# Patient Record
Sex: FEMALE | Race: WHITE | NOT HISPANIC OR LATINO | Employment: OTHER | URBAN - METROPOLITAN AREA
[De-identification: names, ages, dates, MRNs, and addresses within clinical notes are randomized per-mention and may not be internally consistent; named-entity substitution may affect disease eponyms.]

---

## 2017-01-23 ENCOUNTER — GENERIC CONVERSION - ENCOUNTER (OUTPATIENT)
Dept: OTHER | Facility: OTHER | Age: 82
End: 2017-01-23

## 2017-04-14 ENCOUNTER — GENERIC CONVERSION - ENCOUNTER (OUTPATIENT)
Dept: OTHER | Facility: OTHER | Age: 82
End: 2017-04-14

## 2017-04-27 ENCOUNTER — ALLSCRIPTS OFFICE VISIT (OUTPATIENT)
Dept: OTHER | Facility: OTHER | Age: 82
End: 2017-04-27

## 2017-06-08 ENCOUNTER — ALLSCRIPTS OFFICE VISIT (OUTPATIENT)
Dept: OTHER | Facility: OTHER | Age: 82
End: 2017-06-08

## 2017-12-08 ENCOUNTER — ALLSCRIPTS OFFICE VISIT (OUTPATIENT)
Dept: OTHER | Facility: OTHER | Age: 82
End: 2017-12-08

## 2017-12-20 NOTE — PROGRESS NOTES
Assessment  1  Parkinson's disease (332 0) (G20)    Discussion/Summary  Discussion Summary:   Patient continues to have severe rigidity, bradykinesia and prominent postural instability  She has had overall improvement with increased Sinemet doses in the past however she also had developed hallucinations which improved after stopping Amantadine  Discussed a trial of increasing her Sinemet dose once again but very slowly to try and avoid side effects  She will take 2 5tabs at 7:30am and 2tabs at 11:30am and 4:30pm for the next 2 weeks then 2 5tabs at 7:30am and 11:30am and 2tabs at 4:30pm for 2 weeks then 2 5tabs tid  If she has any side effects will reduce back to 2tabs tid  May need to consider a trial of Seroquel or Nuplazid to help with hallucinations in order to increase her levodopa in the future  She was also encouraged to remain active and ordered PT to work with her on balance, gait, mobility, ROM and general conditioning  Forwarding the case and plan to Dr Chen Mann for review  Counseling Documentation With Imm: The patient, patient's family was counseled regarding instructions for management,-- prognosis,-- patient and family education,-- impressions,-- risks and benefits of treatment options  total time of encounter was 35 minutes-- and-- 18 minutes was spent counseling  Chief Complaint  Chief Complaint Free Text Note Form: Patient present for a neurological follow up for Parkinson's disease with heaviness in her legs  History of Present Illness  HPI: Rosalee Page is an 80year old [place] [address] resident with Parkinsonâs disease, who presents for follow up  She previously followed with Dr Yunier Baez and then Dr Ifeoma Connelly  To review, parkinsonâs disease was diagnosed in 2000 after she developed a left hand rest tremor  She was started on Sinemet which helped  She had hallucinations 6-7 years ago   Ever since then she has been on Abilify which was prescribed at [facility]  She saw another neurologist but he wanted her to come off the Abilify due to its interaction with Sinemet, so they decided to seek care elsewhere  Her son states when she had the hallucination she refused to eat and became really sick  When she originally presented she was taking amantadine 50mg q 8am and 12pm, Sinemet 25/100 1 po tid (7:30, 11:30, 4:30pm) Â½ hour prior to meals, and also on Abilify 5mg daily  There is no family history of PD  At her initial visit she was noted to have severe rigidity, moderate bradykinesia, rest tremor and prominent postural instability with an inability to walk  Sinemet was increased despite being on Abilify  At her last visit she had noted overall improvement with the increased Sinemet dose  She did have some hallucniations however and her Amantadine was stopped with improvement  She is currently taking Sinemet 25/100mg 2tabs tid (7:30am, 11:30am, 4:30pm)  She remains on Abilify 5mg daily  She feels that she is overall doing well  She does feel that her legs are feeling heavier when walking  She walks to and from the bathroom  No falls  She does exercises daily at the facility  She requires assistance with dressing and showering  She is eating well and she is able to feed herself  As long as she remembers to take some bites then she is fine but if she takes bigger bites she can choke at times  She has been having some tightness in the legs when she is sitting in her chair trying to relax or in the evening when she is going to bed  It improves when she gets up and walks  This can happen every few nights  She feels that her memory is still good  She sometimes feels that she is âmore shakyâ about 2 hours after taking a dose of Sinemet  She does not have a clear on or off with the medication  Her tremor comes and goes and there are times these are very well controlled  Reviewed her ROS, FH, SH and social history        Review of Systems  Neurological ROS:  Constitutional: no fever, no chills, no recent weight gain, no recent weight loss, no complaints of feeling tired, no changes in appetite  HEENT:  no sinus problems, not feeling congested, no blurred vision, no dryness of the eyes, no eye pain, no hearing loss, no tinnitus, no mouth sores, no sore throat, no hoarseness, no dysphagia, no masses, no bleeding  Cardiovascular:  no chest pain or pressure, no palpitations present, the heart rate was not rapid or irregular, no swelling in the arms or legs, no poor circulation  Respiratory:  no unusual or persistant cough, no shortness of breath with or without exertion  Gastrointestinal:  no nausea, no vomiting, no diarrhea, no abdominal pain, no changes in bowel habits, no melena, no loss of bowel control  Genitourinary:  no incontinence, no feelings of urinary urgency, no increase in frequency, no urinary hesitancy, no dysuria, no hematuria  Musculoskeletal:  no arthralgias, no myalgias, no immobility or loss of function, no head/neck/back pain, no pain while walking  Integumentary  no masses, no rash, no skin lesions, no livedo reticularis  Psychiatric:  no anxiety, no depression, no mood swings, no psychiatric hospitalizations, no sleep problems  Endocrine  no unusual weight loss or gain, no excessive urination, no excessive thirst, no hair loss or gain, no hot or cold intolerance, no menstrual period change or irregularity, no loss of sexual ability or drive, no erection difficulty, no nipple discharge  Hematologic/Lymphatic:  no unusual bleeding, no tendency for easy bruising, no clotting skin or lumps  Neurological General:  no headache, no nausea or vomiting, no lightheadedness, no convulsions, no blackouts, no syncope, no trauma, no photopsia, no increased sleepiness, no trouble falling asleep, no snoring, no awakening at night    Neurological Mental Status:  no confusion, no mood swings, no alteration or loss of consciousness, no difficulty expressing/understanding speech, no memory problems  Neurological Cranial Nerves:  no blurry or double vision, no loss of vision, no face drooping, no facial numbness or weakness, no taste or smell loss/changes, no hearing loss or ringing, no vertigo or dizziness, no dysphagia, no slurred speech  Neurological Motor findings include: tremor  Neurological Coordination:  no unsteadiness, no vertigo or dizziness, no clumsiness, no problems reaching for objects  Neurological Sensory:  no numbness, no pain, no tingling, does not fall when eyes closed or taking a shower  Neurological Gait: difficulty walking  Active Problems  1  Ambulatory dysfunction (719 7) (R26 2)   2  Benign essential HTN (401 1) (I10)   3  Depression with anxiety (300 4) (F41 8)   4  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)   5  Edema (782 3) (R60 9)   6  Essential hypertriglyceridemia (272 1) (E78 1)   7  Hypercholesterolemia (272 0) (E78 00)   8  Osteoarthritis of knee (715 36) (M17 10)   9  Parkinson's disease (332 0) (G20)    Past Medical History  1  History of Constipation (564 00) (K59 00)   2  History of Dermatitis (692 9) (L30 9)   3  History of Encounter for screening mammogram for malignant neoplasm of breast (V76 12) (Z12 31)   4  History of dehydration (V12 29) (Z86 39)   5  History of dizziness (V13 89) (Z87 898)   6  History of Lightheadedness (780 4) (R42)    Surgical History  1  History of Denial Of Any Significant Medical History    Family History  Mother    1  No pertinent family history  Family History    2  Family history of No Significant Family History    Social History   · Denied: History of Alcohol Use (History)   · Never A Smoker   · Never Drank Alcohol   · Never Used Drugs    Current Meds   1  Abilify 5 MG Oral Tablet; TAKE 1 TABLET DAILY  Requested for: 80PNE6623; Last Rx:27Jan2015 Ordered   2  Alcohol Swabs Pad; USE AS DIRECTED; Therapy: 95YOB9520 to (Last Rx:19Jan2015)  Requested for: 02NHK3845 Ordered   3   BD Insulin Syringe Ultrafine 30G X 1/2 0 3 ML Miscellaneous; USE AS DIRECTED; Therapy: 48WXL6202 to (Last Rx:32Pvw6758)  Requested for: 35Glg3960; Status: ACTIVE - Renewal Denied Ordered   4  Carbidopa-Levodopa  MG Oral Tablet; 2 po tid; Therapy: 74GSH3591 to (Evaluate:17Mar2018)  Requested for: 56Ndc8337; Last Rx:28Slx9198 Ordered   5  Ecotrin 81 MG TBEC; TAKE 1 TABLET DAILY; Therapy: (Recorded:19Avr5961) to Recorded   6  Furosemide 40 MG Oral Tablet; TAKE 1 TABLET DAILY  Requested for: 77PWD4279; Last Rx:27Jan2015 Ordered   7  Januvia 50 MG Oral Tablet; TAKE 1 TABLET DAILY  Requested for: 76QXX7752; Last Rx:27Jan2015 Ordered   8  Ketoconazole 2 % External Cream; APPLY SPARINGLY TO AFFECTED AREA(S) PRN  Requested for: 43Uqw8766; Last Rx:08Ure9345; Status: ACTIVE - Renewal Denied Ordered   9  Metoprolol Succinate  MG Oral Tablet Extended Release 24 Hour; TAKE 1 TABLET Bedtime  Requested for: 40LDP1945; Last Rx:09Jan2015 Ordered   10  Neurontin 300 MG Oral Capsule; TAKE 1 CAPSULE AT BEDTIME; Therapy: (Recorded:04Xix1097) to Recorded   11  Norvasc 5 MG Oral Tablet; TAKE 1 TABLET DAILY AS DIRECTED; Therapy: (Recorded:08Jun2017) to Recorded   12  NovoLOG 100 UNIT/ML Subcutaneous Solution; INJECT SUBCUTANEOUSLY AS  DIRECTED; Therapy: 35ZNM6203 to (Last Rx:06Mar2015)  Requested for: 49TAM8533 Ordered   13  Oxycodone-Acetaminophen 5-325 MG Oral Tablet; TAKE ONE TAB PRN every 6 hours as  needed; Last Rx:36Zme9331 Ordered   14  PreviDent 5000 Booster 1 1 % Dental Paste; USE AS DIRECTED  May be kept at  bedside; Last OF:21VQT9102 Ordered   15  PreviDent 5000 Booster 1 1 % Dental Paste; USE AS DIRECTED; Therapy: (334 6172) to Recorded   16  Prodigy Lancets 28G MISC; TESTS BID  USE AS DIRECTED; Therapy: (873 6120) to Recorded   17  SB Polyethylene Glycol 3350 Oral Powder; MIX 1 CAPFUL (17GM) IN 8 OUNCES OF  LIQUID AND DRINK DAILY  Requested for: 47OYC5839; Last Rx:59Gfw8914 Ordered   18   Simvastatin 20 MG Oral Tablet; TAKE 1 TABLET DAILY  Requested for: 08TCP0874; Last  Rx:27Jan2015 Ordered   19  TRUEtest Test In Vitro Strip; TEST TWICE DAILY  Requested for: 44RDO6543; Last  HJ:50IJV1365 Ordered   20  Tylenol 325 MG Oral Tablet; TAKE 2 TABLET Every 4 hours PRN  Requested for:  39ALT8716; Last Rx:63Uhn5136 Ordered   21  Vitamin B-12 500 MCG Oral Tablet; TAKE 1 TABLET DAILY  Requested for: 91LLU3760; Last Rx:27Jan2015 Ordered   22  Vitamin D3 1000 UNIT Oral Tablet; take 2 tablet daily  Requested for: 86UCZ8312; Last  Rx:27Jan2015 Ordered    Allergies  1  MetFORMIN HCl TABS   2  Penicillins   3  Sulfa Drugs   4  Torsemide TABS    Vitals  Signs   Recorded: 53WAS8517 23:39VC   Systolic: 620, LUE, Standing  Diastolic: 60, LUE, Standing  Recorded: 79FPC6279 01:29PM   Heart Rate: 64, L Brachial Artery  Pulse Quality: Normal, L Brachial Artery  Respiration Quality: Normal  Respiration: 12  Systolic: 372, LUE, Sitting  Diastolic: 62, LUE, Sitting  Height Unobtainable: Yes  Weight Unobtainable: Yes    Physical Exam   Constitutional  General appearance: Abnormal  -- (Sitting in wheel chair  Moderate Hypomimia 2  Moderate hypophonia 2  )  Eyes  Ophthalmoscopic examination: Vision is grossly normal  Gross visual field testing by confrontation shows no abnormalities  EOMI in both eyes  Conjunctivae clear  Eyelids normal palpebral fissures equal  Orbits exhibit normal position  No discharge from the eyes  PERRL  Bilateral optic discs: not visualized  Musculoskeletal  Gait and station: Abnormal  -- (Only arises with assistance 3  Posture moderately stooped 2  Spontaneous retropulsion 3  Severe bradykinesia 3  Ambulated with walker  Very small steps with shuffling gait  No freezing  En bloc turns  )  Muscle strength: Normal strength throughout     Muscle tone: Abnormal  -- (Rigidity present in neck 4, RUE 2, LUE 3, RLE 2, LLE3)  Involuntary movements: Abnormal involuntary movements were observed  -- (Mild to moderate amplitude rest tremor RUE 0, LUE 2, RLE 0, LLE 0  No action tremors of RUE, LUE  Moderate bradykinesia on FT 2,3, HG 2,3, CARLINE 2,3 , HT 2,2 and toe taps 2,3  No dyskinesia  Striatal posturing of the left hand)  Neurologic  Orientation to person, place, and time: Normal    Recent and remote memory: Demonstrates normal memory  Attention span and concentration: Normal thought process and attention span  Language: Names objects, able to repeat phrases and speaks spontaneously  Fund of knowledge: Normal vocabulary with appropriate knowledge of current events and past history  2nd cranial nerve: Normal    3rd, 4th, and 6th cranial nerves: Normal    5th cranial nerve: Normal    7th cranial nerve: Normal    8th cranial nerve: Normal    9th cranial nerve: Normal    11th cranial nerve: Normal    12th cranial nerve: Normal    Sensation: Normal   Sensory exam: intact to light touch  Reflexes: Abnormal  -- (positive glabellar )  Coordination: Abnormal    Mood and affect: Normal        Attending Note  Collaborating Physician Note: Collaborating Note: I agree with the Advanced Practitioner note   I discussed the case with the Advanced Practitioner and reviewed the AP note      Signatures   Electronically signed by : Nilesh Potts, [facility]; Dec 19 2017 12:39PM EST                       (Author)    Electronically signed by : Reji Fulton MD; Dec 19 2017 12:57PM EST                       (Author)

## 2018-01-12 VITALS
HEIGHT: 60 IN | SYSTOLIC BLOOD PRESSURE: 120 MMHG | DIASTOLIC BLOOD PRESSURE: 72 MMHG | BODY MASS INDEX: 35.34 KG/M2 | HEART RATE: 69 BPM | RESPIRATION RATE: 12 BRPM | OXYGEN SATURATION: 94 % | WEIGHT: 180 LBS

## 2018-01-13 VITALS
HEIGHT: 60 IN | HEART RATE: 68 BPM | DIASTOLIC BLOOD PRESSURE: 77 MMHG | OXYGEN SATURATION: 98 % | SYSTOLIC BLOOD PRESSURE: 180 MMHG

## 2018-01-23 VITALS — RESPIRATION RATE: 12 BRPM | HEART RATE: 64 BPM | SYSTOLIC BLOOD PRESSURE: 142 MMHG | DIASTOLIC BLOOD PRESSURE: 60 MMHG

## 2018-04-24 ENCOUNTER — OFFICE VISIT (OUTPATIENT)
Dept: NEUROLOGY | Facility: CLINIC | Age: 83
End: 2018-04-24
Payer: MEDICARE

## 2018-04-24 VITALS
DIASTOLIC BLOOD PRESSURE: 70 MMHG | BODY MASS INDEX: 33.61 KG/M2 | HEIGHT: 61 IN | SYSTOLIC BLOOD PRESSURE: 172 MMHG | HEART RATE: 80 BPM | WEIGHT: 178 LBS

## 2018-04-24 DIAGNOSIS — I10 ESSENTIAL HYPERTENSION: ICD-10-CM

## 2018-04-24 DIAGNOSIS — G20 PARKINSON'S DISEASE (HCC): Primary | ICD-10-CM

## 2018-04-24 PROCEDURE — 99214 OFFICE O/P EST MOD 30 MIN: CPT | Performed by: PHYSICIAN ASSISTANT

## 2018-04-24 RX ORDER — AMLODIPINE BESYLATE 5 MG/1
5 TABLET ORAL DAILY
COMMUNITY
Start: 2014-08-20 | End: 2019-01-01 | Stop reason: HOSPADM

## 2018-04-24 RX ORDER — BIOTIN 1 MG
2 TABLET ORAL DAILY
COMMUNITY

## 2018-04-24 RX ORDER — FUROSEMIDE 40 MG/1
20 TABLET ORAL DAILY
COMMUNITY
Start: 2011-03-03

## 2018-04-24 RX ORDER — METOPROLOL SUCCINATE 100 MG/1
1 TABLET, EXTENDED RELEASE ORAL
COMMUNITY

## 2018-04-24 RX ORDER — GABAPENTIN 300 MG/1
1 CAPSULE ORAL
COMMUNITY
End: 2018-12-05 | Stop reason: ALTCHOICE

## 2018-04-24 RX ORDER — AMANTADINE HYDROCHLORIDE 50 MG/5ML
5 SOLUTION ORAL 2 TIMES DAILY
COMMUNITY
Start: 2015-05-26 | End: 2018-04-24 | Stop reason: ALTCHOICE

## 2018-04-24 RX ORDER — ARIPIPRAZOLE 5 MG/1
10 TABLET ORAL DAILY
COMMUNITY

## 2018-04-24 RX ORDER — CHOLECALCIFEROL (VITAMIN D3) 125 MCG
1 CAPSULE ORAL DAILY
COMMUNITY

## 2018-04-24 RX ORDER — SIMVASTATIN 20 MG
20 TABLET ORAL DAILY
COMMUNITY
Start: 2011-03-03

## 2018-04-24 RX ORDER — ACETAMINOPHEN 325 MG/1
325 TABLET ORAL EVERY 6 HOURS
COMMUNITY
Start: 2013-04-16 | End: 2018-12-05 | Stop reason: ALTCHOICE

## 2018-04-24 RX ORDER — KETOCONAZOLE 20 MG/G
CREAM TOPICAL
COMMUNITY
End: 2018-12-05 | Stop reason: ALTCHOICE

## 2018-04-24 NOTE — ASSESSMENT & PLAN NOTE
Patient hypertensive today  After looking back through her previous pressures it appears this is an issue at times  Would recommend close watch on this and treat if needed

## 2018-04-24 NOTE — PROGRESS NOTES
Patient ID: Fátima Lin is a 80 y o  female  Assessment/Plan:    Parkinson's disease ([facility] [address] )  Patient continues to have rigidity, tremor, bradykinesia and prominent postural instability  She has been tolerating the increased Sinemet well without any hallucinations  It appears she has improvement of her tremors after taking a dose of Sinemet and they worsen around the time she is due  Will try and make a further increase at this time to Sinemet 2tabs qid  She will take 2tabs at 7:30am, 10:30am, 1:30pm, 4:30pm   She also remains on Abilify 5mg daily which was initially started to control her hallucinations  In the future if hallucinations become an issue then would consider switching from Abilify to Nuplazid or Seroquel  She would benefit from more therapy and ROM exercises to maintain her mobility and function  Essential hypertension  Patient hypertensive today  After looking back through her previous pressures it appears this is an issue at times  Would recommend close watch on this and treat if needed  Subjective:    Fátima Lin is an 80year old [place] [address] resident with Parkinson's disease, who presents for follow up  She previously followed with Dr Sil Sanchez and then Dr Dianelys Arango  To review, parkinson's disease was diagnosed in 2000 after she developed a left hand rest tremor  She was started on Sinemet which helped  She had hallucinations 6-7 years ago  Ever since then she has been on Abilify which was prescribed at [facility]  She saw another neurologist but he wanted her to come off the Abilify due to its interaction with Sinemet, so they decided to seek care elsewhere  Her son states when she had the hallucination she refused to eat and became really sick  When she originally presented she was taking amantadine 50mg q 8am and 12pm, Sinemet 25/100 1 po tid (7:30, 11:30, 4:30pm) ½ hour prior to meals, and also on Abilify 5mg daily   There is no family history of PD  At her initial visit she was noted to have severe rigidity, moderate bradykinesia, rest tremor and prominent postural instability with an inability to walk  Sinemet was increased despite being on Abilify with some overall improvement  Hallucinations with this increase however that improved off Amantadine  At her last visit she continued to have rigidity, bradykinesia and prominent postural instability  Discussed a trial of increasing her Sinemet further and she is currently on 2 5tabs tid (7:30am, 11:30am, 4:30pm)  She remains on Abilify 5mg daily  She feels that her legs are getting heavier  She also has some occasional tremor in the left hand  She feels the right side tremor is pretty well controlled  She resides at [facility] in assisted living  She has assistance with dressing and showering  She is able to transfer from the chair to the bathroom with the walker  No falls  She is able to feed herself  She does have purred food which she tolerates well  She sleeps well through the night  No complaints of any hallucinations since the last visit even with the increased Sinemet dose  The nurses give her medications  No drooling during the day  She does notice the tremor more around the time she is due for a dose of Sinemet and then it improves a bit after taking it  No clear improvement of the heaviness in her legs  Total time spent today 35 minutes  Greater than 50% of total time was spent with the patient and / or family counseling and / or coordination of care           Objective:    Blood pressure (!) 172/70, pulse 80, height 5' 1" (1 549 m), weight 80 7 kg (178 lb)  Physical Exam   Constitutional: She appears well-developed and well-nourished  Eyes: EOM are normal  Pupils are equal, round, and reactive to light  Neurological Exam    Mental Status  The patient is alert  She has normal attention span and concentration   She has a normal fund of knowledge  Cranial Nerves    CN II: The patient's visual acuity and visual fields are normal   CN III, IV, VI: The patient's pupils are equally round and reactive to light and ocular movements are normal   CN V: The patient has normal facial sensation  CN VII:  The patient has symmetric facial movement  CN VIII:  The patient's hearing is normal   CN IX, X: The patient has symmetric palate movement and normal gag reflex  CN XI: The patient's shoulder shrug strength is normal   CN XII: The patient's tongue is midline without atrophy or fasciculations  Motor    Sitting in wheel chair  Moderate Hypomimia 2  Moderate hypophonia 2  Right head tilt  Rigidity present in neck 4, RUE 2, LUE 2, RLE 2, LLE 2  Mild to moderate amplitude rest tremor RUE 0, LUE 2, RLE 0, LLE 0  No action tremors of RUE, LUE  Moderate bradykinesia on FT 2,3, HG 2,3, CARLINE 2,3 , HT 2,2 and toe taps 2,3  No dyskinesia  Striatal posturing of the left hand  Sensory  The patient's sensation is to light touch  Reflexes  She has glabellar tap and palmomental (Bilaterally  ) release signs present  Gait and Coordination    Arises from the wheelchiar with assistance 3  Posture moderately stooped 2 with walker  Spontaneous retropulsion 3  Severe bradykinesia 3 with movements  Very small steps with shuffling gait  Slight freezing steps through the doorway  En bloc turns  ROS:    Review of Systems   Constitutional: Negative for appetite change and fever  HENT: Negative  Negative for hearing loss, tinnitus, trouble swallowing and voice change  Eyes: Negative  Negative for photophobia and pain  Respiratory: Negative  Negative for shortness of breath  Cardiovascular: Negative  Negative for palpitations  Gastrointestinal: Negative  Negative for nausea and vomiting  Endocrine: Negative  Negative for cold intolerance and heat intolerance  Genitourinary: Negative  Negative for dysuria, frequency and urgency  Musculoskeletal: Positive for gait problem  Negative for myalgias and neck pain  Skin: Negative  Negative for rash  Neurological: Positive for dizziness, tremors and weakness  Negative for seizures, syncope, facial asymmetry, speech difficulty, light-headedness, numbness and headaches  Hematological: Negative  Does not bruise/bleed easily  Psychiatric/Behavioral: Negative  Negative for confusion, hallucinations and sleep disturbance

## 2018-04-24 NOTE — PATIENT INSTRUCTIONS
Patient continues to have rigidity, tremor, bradykinesia and prominent postural instability  She has been tolerating the increased Sinemet well without any hallucinations  It appears she has improvement of her tremors after taking a dose of Sinemet and they worsen around the time she is due  Will try and make a further increase at this time to Sinemet 2tabs qid  She will take 2tabs at 7:30am, 10:30am, 1:30pm, 4:30pm   She also remains on Abilify 5mg daily which was initially started to control her hallucinations  In the future if hallucinations become an issue then would consider switching from Abilify to Nuplazid or Seroquel  She would benefit from more therapy and ROM exercises to maintain her mobility and function  Patient hypertensive on exam today  Would recommend continue follow up with this and treat if needed

## 2018-12-05 ENCOUNTER — OFFICE VISIT (OUTPATIENT)
Dept: NEUROLOGY | Facility: CLINIC | Age: 83
End: 2018-12-05
Payer: MEDICARE

## 2018-12-05 VITALS — BODY MASS INDEX: 33.63 KG/M2 | SYSTOLIC BLOOD PRESSURE: 124 MMHG | HEIGHT: 61 IN | DIASTOLIC BLOOD PRESSURE: 80 MMHG

## 2018-12-05 DIAGNOSIS — R44.3 HALLUCINATIONS: ICD-10-CM

## 2018-12-05 DIAGNOSIS — G20 PARKINSON'S DISEASE (HCC): Primary | ICD-10-CM

## 2018-12-05 PROCEDURE — 99214 OFFICE O/P EST MOD 30 MIN: CPT | Performed by: PSYCHIATRY & NEUROLOGY

## 2018-12-05 RX ORDER — ACETAMINOPHEN 325 MG/1
325 TABLET ORAL EVERY 4 HOURS PRN
COMMUNITY

## 2018-12-05 RX ORDER — POLYETHYLENE GLYCOL 3350 17 G/17G
17 POWDER, FOR SOLUTION ORAL DAILY
COMMUNITY

## 2018-12-05 NOTE — PROGRESS NOTES
Assessment/Plan:    Parkinson's disease [facility])  The patient is an 49-year-old woman with Parkinson's disease, symptom onset in 2000 with left hand tremor complicated by hallucinations on Abilify, postural instability gait disorder  The family remains very hesitant to take the patient off of Abilify given the severity of her hallucinations back in 2010  Her symptoms are overall stable prior  She has not noticed an obvious improvement with increased frequency of Sinemet dosing  We reviewed the patient's parkinsonism medical history today  We agreed not to make any changes in her Sinemet at this time  The patient would likely find clear improvement in her motor symptoms if she was taken off of the Abilify  The patient was not interested in 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  Diagnoses and all orders for this visit:    Parkinson's disease ([facility] [address] )    Hallucinations    Other orders  -     insulin aspart (NOVOLOG) 100 units/mL injection; Novolog U-100 Insulin aspart 100 unit/mL subcutaneous solution  -     polyethylene glycol (MIRALAX) 17 g packet; Take 17 g by mouth daily  -     nystatin (MYCOSTATIN) 100,000 units/mL suspension; Apply 500,000 Units to the mouth or throat 4 (four) times a day  -     acetaminophen (TYLENOL) 325 mg tablet; Take 325 mg by mouth every 4 (four) hours as needed for mild pain  -     Insulin Syringes, Disposable, (MONOJECT INS SYR 1CC) U-100 1 ML MISC; 10 Units by Does not apply route 2 (two) times a day 7 am and 4 pm  -     Sennosides (SENNA-TABS PO); Take 100 mg by mouth 2 (two) times a day 9 am and 9 pm  -     Dimethicone (REMEDY SKIN REPAIR) 1 5 % CREA;  Apply topically daily as needed  -     Alcohol Swabs ([employer]S ALCOHOL PREP SWABS) 70 % PADS; by Does not apply route daily as needed        Subjective:     Patient ID: Petrona Locke is a 80 y o  female  I had the pleasure of seeing your patient, Petrona Locke in the [address] at the [facility] for Neuroscience  Petrona Locke is an 80year old [facility] resident who presents in follow up for Parkinson's disease, symptom onset in 2000 with left hand tremor now complicated by hallucinations on abilify and prominent postural instability  She was initially followed by Dr Sachin Bateman after her diagnosis in 2000 and then Dr Jakob Rosado followed by Dr Nusrat Pickard  She appears to be sinemet responsive  Her sinemet has been slowly increased over the years  She developed hallucinations in 2010 and was started on Abilify at [facility] which did help  It appears her family has not wanted to take her off of the Abilify because she was doing so poorly when she had the hallucinations in the past (refusing to eat ect)  In 2008 she moved to a long term care facility  When she was last here her she was noted to have some wearing off which consisted of increased tremor so her sinemet was increased from 2 5 tabs TID to 2 tabs, 4 times daily  Today, she reports her walking is getting worse  Mostly wheelchair bound for > 1 year  "sometimes I have tremors and sometimes I don't"  She feels that her voice has gotten softer  Sometimes nothing comes out when she tries to speak - she is not interested in speech therapy  Otherwise she denies any major change  Feels some leg discomfort at night when trying to get to bed  She has never been convinced that the sinemet helps much with her symptoms       Current medications and timing:   Sinemet 25/100; 2tabs, 4 times per day @ 7:30am, 10:30am, 1:30pm, 4:30pm     Prior medication trials:   Amantadine - was concerned that it might cause hallucinations   Requip - Drowsiness     Sinemet - Nausea and vomiting initially     Regarding motor symptoms:   Tremor: still worse on the left, rarely on the right  Slowness: very slow Stiffness: left hand hand mostly, trouble opening her left hand  Trouble with swallowing: puree thick  Small bites  Appetite: no issues   Sleep: disrupted by dreams sometimes   Mood: mood is ok   Gait: no longer ambulatory   Help with taking medication: yes  Help preparing meals: yes  Help with eating: no   Help with bathing and showering: yes  Help with dressing: yes  Help with toileting: yes   Help with house hold tasks: yes    Regarding non-motor symptoms:   Lightheadedness: no   Memory trouble: good  Hallucinations: not recently   Physical activity: some PT in the chair  Bin     Regarding medication complications:  Wearing off: no   Dyskinesias: no (maybe when she was on the amantadine)   Dose failures:  maybe  Inconsistent results  The following portions of the patient's history were reviewed and updated as appropriate: allergies, current medications, past family history, past medical history, past social history, past surgical history and problem list       Objective:      /80 (BP Location: Left arm, Patient Position: Sitting, Cuff Size: Standard)   Ht 5' 1" (1 549 m)   BMI 33 63 kg/m²       Physical Exam    Neurological Exam   She presents in a wheelchair  She is awake and alert in no acute distress  She is cooperative with the exam  Increased response latency when answering questions  Relates her history without much difficulty  No paraphasic errors  Follows midline and appendicular commands without issue                                 Time since last dose:  1 hours     Speech  2    Facial Expression  3    Rigidity - Neck  2    Rigidity - Upper Extremity (Right)  2    Rigidity - Upper Extremity (Left)   2 L>R    Rigidity - Lower Extremity (Right)  3    Rigidity - Lower Extremity (Left)   4    Finger Taps (Right)   3    Finger Taps (Left)   4    Hand Movement (Right)  3    Hand Movement (Left)   4    Pronation/Supination (Right)  3    Pronation/Supination (Left)   4    Toe Tapping (Right) 4    Toe Tapping (Left) 3    Leg Agility (Right)  3    Leg Agility (Left)   4    Arising from Chair   Unable to stand and walk    Gait       Freezing of Gait     Postural Stability       Posture     Global spontaneity of movement 2    Postural Tremor (Right) 1    Postural Tremor (Left) 0    Kinetic Tremor (Right)  0    Kinetic Tremor (Left)  1    Rest tremor amplitude RUE 0    Rest tremor amplitude LUE 3    Rest tremor amplitude RLE 0    Reset tremor amplitude LLE 2    Lip/Jaw Tremor  3    Consistency of tremor 3    Motor Exam Total:        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  Review of Systems   Constitutional: Negative  Negative for appetite change and fever  HENT: Negative  Negative for hearing loss, tinnitus, trouble swallowing and voice change  Eyes: Negative  Negative for photophobia and pain  Respiratory: Negative  Negative for shortness of breath  Cardiovascular: Negative  Negative for palpitations  Gastrointestinal: Negative  Negative for nausea and vomiting  Endocrine: Negative  Negative for cold intolerance and heat intolerance  Genitourinary: Negative  Negative for dysuria, frequency and urgency  Musculoskeletal: Positive for gait problem  Negative for myalgias and neck pain  Skin: Negative  Negative for rash  Neurological: Positive for tremors (in hands) and speech difficulty (voice changing)  Negative for dizziness, seizures, syncope, facial asymmetry, weakness, light-headedness, numbness and headaches  Hematological: Negative  Does not bruise/bleed easily  Psychiatric/Behavioral: Positive for sleep disturbance (dreams are seeming real)  Negative for confusion and hallucinations

## 2018-12-05 NOTE — LETTER
December 5, 2018     Monica [id number]     Patient: Wilfrido Clifton   YOB: 1933   Date of Visit: 12/5/2018       Dear Dr Sujit Richardson:    Thank you for referring Wilfrido Clifton to me for evaluation  Below are my notes for this consultation  If you have questions, please do not hesitate to call me  I look forward to following your patient along with you  Sincerely,        Otf Samayoa MD        CC: No Recipients  Otf Samayoa MD  12/5/2018 11:10 AM  Sign at close encounter  Assessment/Plan:    Parkinson's disease [facility])  The patient is an 77-year-old woman with Parkinson's disease, symptom onset in 2000 with left hand tremor complicated by hallucinations on Abilify, postural instability gait disorder  The family remains very hesitant to take the patient off of Abilify given the severity of her hallucinations back in 2010  Her symptoms are overall stable prior  She has not noticed an obvious improvement with increased frequency of Sinemet dosing  We reviewed the patient's parkinsonism medical history today  We agreed not to make any changes in her Sinemet at this time  The patient would likely find clear improvement in her motor symptoms if she was taken off of the Abilify  The patient was not interested in 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  Diagnoses and all orders for this visit:    Parkinson's disease ([facility] [address] )    Hallucinations    Other orders  -     insulin aspart (NOVOLOG) 100 units/mL injection; Novolog U-100 Insulin aspart 100 unit/mL subcutaneous solution  -     polyethylene glycol (MIRALAX) 17 g packet; Take 17 g by mouth daily  -     nystatin (MYCOSTATIN) 100,000 units/mL suspension;  Apply 500,000 Units to the mouth or throat 4 (four) times a day  -     acetaminophen (TYLENOL) 325 mg tablet; Take 325 mg by mouth every 4 (four) hours as needed for mild pain  -     Insulin Syringes, Disposable, (MONOJECT INS SYR 1CC) U-100 1 ML MISC; 10 Units by Does not apply route 2 (two) times a day 7 am and 4 pm  -     Sennosides (SENNA-TABS PO); Take 100 mg by mouth 2 (two) times a day 9 am and 9 pm  -     Dimethicone (REMEDY SKIN REPAIR) 1 5 % CREA; Apply topically daily as needed  -     Alcohol Swabs (CVS ALCOHOL PREP SWABS) 70 % PADS; by Does not apply route daily as needed        Subjective:     Patient ID: Kevin Gomez is a 80 y o  female  I had the pleasure of seeing your patient, Kevin Gomez in the 2020 [facility] at the [address] [place] [address] for Neuroscience  Kevin Gomez is an 80year old [place] [address] resident who presents in follow up for Parkinson's disease, symptom onset in 2000 with left hand tremor now complicated by hallucinations on abilify and prominent postural instability  She was initially followed by Dr Chey Marie after her diagnosis in 2000 and then Dr Janice Tanner followed by Dr Valeria Rodriguez  She appears to be sinemet responsive  Her sinemet has been slowly increased over the years  She developed hallucinations in 2010 and was started on Abilify at [facility] which did help  It appears her family has not wanted to take her off of the Abilify because she was doing so poorly when she had the hallucinations in the past (refusing to eat ect)  In 2008 she moved to a long term care facility  When she was last here her she was noted to have some wearing off which consisted of increased tremor so her sinemet was increased from 2 5 tabs TID to 2 tabs, 4 times daily  Today, she reports her walking is getting worse  Mostly wheelchair bound for > 1 year  "sometimes I have tremors and sometimes I don't"  She feels that her voice has gotten softer  Sometimes nothing comes out when she tries to speak - she is not interested in speech therapy   Otherwise she denies any major change  Feels some leg discomfort at night when trying to get to bed  She has never been convinced that the sinemet helps much with her symptoms  Current medications and timing:   Sinemet 25/100; 2tabs, 4 times per day @ 7:30am, 10:30am, 1:30pm, 4:30pm     Prior medication trials:   Amantadine  was concerned that it might cause hallucinations   Requip  Drowsiness     Sinemet  Nausea and vomiting initially     Regarding motor symptoms:   Tremor: still worse on the left, rarely on the right  Slowness: very slow   Stiffness: left hand hand mostly, trouble opening her left hand  Trouble with swallowing: puree thick  Small bites  Appetite: no issues   Sleep: disrupted by dreams sometimes   Mood: mood is ok   Gait: no longer ambulatory   Help with taking medication: yes  Help preparing meals: yes  Help with eating: no   Help with bathing and showering: yes  Help with dressing: yes  Help with toileting: yes   Help with house hold tasks: yes    Regarding non-motor symptoms:   Lightheadedness: no   Memory trouble: good  Hallucinations: not recently   Physical activity: some PT in the chair  Bin     Regarding medication complications:  Wearing off: no   Dyskinesias: no (maybe when she was on the amantadine)   Dose failures:  maybe  Inconsistent results  The following portions of the patient's history were reviewed and updated as appropriate: allergies, current medications, past family history, past medical history, past social history, past surgical history and problem list       Objective:      /80 (BP Location: Left arm, Patient Position: Sitting, Cuff Size: Standard)   Ht 5' 1" (1 549 m)   BMI 33 63 kg/m²        Physical Exam    Neurological Exam   She presents in a wheelchair  She is awake and alert in no acute distress  She is cooperative with the exam  Increased response latency when answering questions  Relates her history without much difficulty  No paraphasic errors  Follows midline and appendicular commands without issue  Time since last dose:  1 hours     Speech  2    Facial Expression  3    Rigidity - Neck  2    Rigidity - Upper Extremity (Right)  2    Rigidity - Upper Extremity (Left)   2 L>R    Rigidity - Lower Extremity (Right)  3    Rigidity - Lower Extremity (Left)   4    Finger Taps (Right)   3    Finger Taps (Left)   4    Hand Movement (Right)  3    Hand Movement (Left)   4    Pronation/Supination (Right)  3    Pronation/Supination (Left)   4    Toe Tapping (Right) 4    Toe Tapping (Left) 3    Leg Agility (Right)  3    Leg Agility (Left)   4    Arising from Chair   Unable to stand and walk    Gait       Freezing of Gait     Postural Stability       Posture     Global spontaneity of movement 2    Postural Tremor (Right) 1    Postural Tremor (Left) 0    Kinetic Tremor (Right)  0    Kinetic Tremor (Left)  1    Rest tremor amplitude RUE 0    Rest tremor amplitude LUE 3    Rest tremor amplitude RLE 0    Reset tremor amplitude LLE 2    Lip/Jaw Tremor  3    Consistency of tremor 3    Motor Exam Total:        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  Review of Systems   Constitutional: Negative  Negative for appetite change and fever  HENT: Negative  Negative for hearing loss, tinnitus, trouble swallowing and voice change  Eyes: Negative  Negative for photophobia and pain  Respiratory: Negative  Negative for shortness of breath  Cardiovascular: Negative  Negative for palpitations  Gastrointestinal: Negative  Negative for nausea and vomiting  Endocrine: Negative  Negative for cold intolerance and heat intolerance  Genitourinary: Negative  Negative for dysuria, frequency and urgency  Musculoskeletal: Positive for gait problem  Negative for myalgias and neck pain  Skin: Negative  Negative for rash  Neurological: Positive for tremors (in hands) and speech difficulty (voice changing)   Negative for dizziness, seizures, syncope, facial asymmetry, weakness, light-headedness, numbness and headaches  Hematological: Negative  Does not bruise/bleed easily  Psychiatric/Behavioral: Positive for sleep disturbance (dreams are seeming real)  Negative for confusion and hallucinations

## 2018-12-05 NOTE — ASSESSMENT & PLAN NOTE
The patient is an 80-year-old woman with Parkinson's disease, symptom onset in 2000 with left hand tremor complicated by hallucinations on Abilify, postural instability gait disorder  The family remains very hesitant to take the patient off of Abilify given the severity of her hallucinations back in 2010  Her symptoms are overall stable prior  She has not noticed an obvious improvement with increased frequency of Sinemet dosing  We reviewed the patient's parkinsonism medical history today  We agreed not to make any changes in her Sinemet at this time  The patient would likely find clear improvement in her motor symptoms if she was taken off of the Abilify  The patient was not interested in 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

## 2018-12-05 NOTE — PATIENT INSTRUCTIONS
Sinemet 25/100; 2 tabs, 4 times per day @ 7:30am, 10:30am, 1:30pm, 4:30pm  Continue with PT and stretching as tolerated

## 2019-01-01 ENCOUNTER — HOSPITAL ENCOUNTER (INPATIENT)
Facility: HOSPITAL | Age: 84
LOS: 4 days | Discharge: HOME | DRG: 057 | End: 2019-12-18
Attending: EMERGENCY MEDICINE | Admitting: INTERNAL MEDICINE
Payer: MEDICARE

## 2019-01-01 ENCOUNTER — TELEPHONE (OUTPATIENT)
Dept: NEUROLOGY | Facility: CLINIC | Age: 84
End: 2019-01-01

## 2019-01-01 ENCOUNTER — HOSPITAL ENCOUNTER (EMERGENCY)
Facility: HOSPITAL | Age: 84
Discharge: HOME | End: 2019-12-21
Attending: SURGERY | Admitting: SURGERY
Payer: MEDICARE

## 2019-01-01 ENCOUNTER — OFFICE VISIT (OUTPATIENT)
Dept: NEUROLOGY | Facility: CLINIC | Age: 84
End: 2019-01-01
Payer: MEDICARE

## 2019-01-01 ENCOUNTER — APPOINTMENT (EMERGENCY)
Dept: RADIOLOGY | Facility: HOSPITAL | Age: 84
End: 2019-01-01
Payer: MEDICARE

## 2019-01-01 VITALS
OXYGEN SATURATION: 96 % | TEMPERATURE: 98.6 F | HEART RATE: 58 BPM | SYSTOLIC BLOOD PRESSURE: 160 MMHG | BODY MASS INDEX: 31.17 KG/M2 | RESPIRATION RATE: 17 BRPM | WEIGHT: 159.61 LBS | DIASTOLIC BLOOD PRESSURE: 72 MMHG

## 2019-01-01 VITALS
OXYGEN SATURATION: 100 % | DIASTOLIC BLOOD PRESSURE: 70 MMHG | WEIGHT: 158.07 LBS | SYSTOLIC BLOOD PRESSURE: 157 MMHG | HEART RATE: 66 BPM | RESPIRATION RATE: 20 BRPM | TEMPERATURE: 97.9 F

## 2019-01-01 VITALS — DIASTOLIC BLOOD PRESSURE: 82 MMHG | SYSTOLIC BLOOD PRESSURE: 122 MMHG

## 2019-01-01 VITALS
DIASTOLIC BLOOD PRESSURE: 65 MMHG | WEIGHT: 220 LBS | SYSTOLIC BLOOD PRESSURE: 138 MMHG | BODY MASS INDEX: 43.19 KG/M2 | HEIGHT: 60 IN | HEART RATE: 65 BPM

## 2019-01-01 DIAGNOSIS — G20 PARKINSON'S DISEASE (HCC): ICD-10-CM

## 2019-01-01 DIAGNOSIS — R41.0 CONFUSION: ICD-10-CM

## 2019-01-01 DIAGNOSIS — G25.81 RESTLESS LEG SYNDROME: ICD-10-CM

## 2019-01-01 DIAGNOSIS — R01.1 SYSTOLIC EJECTION MURMUR: ICD-10-CM

## 2019-01-01 DIAGNOSIS — R44.1 VISUAL HALLUCINATIONS: ICD-10-CM

## 2019-01-01 DIAGNOSIS — G20 PARKINSON'S DISEASE (HCC): Primary | ICD-10-CM

## 2019-01-01 DIAGNOSIS — R11.2 NAUSEA AND VOMITING, INTRACTABILITY OF VOMITING NOT SPECIFIED, UNSPECIFIED VOMITING TYPE: ICD-10-CM

## 2019-01-01 DIAGNOSIS — R26.9 NEUROLOGIC GAIT DYSFUNCTION: ICD-10-CM

## 2019-01-01 DIAGNOSIS — I10 ESSENTIAL HYPERTENSION: ICD-10-CM

## 2019-01-01 DIAGNOSIS — R44.3 HALLUCINATIONS: Primary | ICD-10-CM

## 2019-01-01 LAB
ALBUMIN SERPL BCP-MCNC: 3.6 G/DL (ref 3.5–5)
ALP SERPL-CCNC: 75 U/L (ref 46–116)
ALT SERPL W P-5'-P-CCNC: 11 U/L (ref 12–78)
AMMONIA PLAS-SCNC: <10 UMOL/L (ref 11–35)
AMPHETAMINES SERPL QL SCN: NEGATIVE
ANION GAP SERPL CALCULATED.3IONS-SCNC: 5 MMOL/L (ref 4–13)
ANION GAP SERPL CALCULATED.3IONS-SCNC: 7 MMOL/L (ref 4–13)
ANION GAP SERPL CALCULATED.3IONS-SCNC: 9 MMOL/L (ref 4–13)
APAP SERPL-MCNC: <2 UG/ML (ref 10–20)
AST SERPL W P-5'-P-CCNC: 14 U/L (ref 5–45)
ATRIAL RATE: 68 BPM
ATRIAL RATE: 82 BPM
BACTERIA UR QL AUTO: ABNORMAL /HPF
BARBITURATES UR QL: NEGATIVE
BASE EXCESS BLDA CALC-SCNC: 7 MMOL/L (ref -2–3)
BASOPHILS # BLD AUTO: 0.03 THOUSANDS/ΜL (ref 0–0.1)
BASOPHILS NFR BLD AUTO: 0 % (ref 0–1)
BENZODIAZ UR QL: NEGATIVE
BILIRUB SERPL-MCNC: 0.89 MG/DL (ref 0.2–1)
BILIRUB UR QL STRIP: NEGATIVE
BUN SERPL-MCNC: 15 MG/DL (ref 5–25)
BUN SERPL-MCNC: 17 MG/DL (ref 5–25)
BUN SERPL-MCNC: 20 MG/DL (ref 5–25)
CA-I BLD-SCNC: 1.01 MMOL/L (ref 1.12–1.32)
CALCIUM SERPL-MCNC: 8.8 MG/DL (ref 8.3–10.1)
CHLORIDE SERPL-SCNC: 101 MMOL/L (ref 100–108)
CHLORIDE SERPL-SCNC: 105 MMOL/L (ref 100–108)
CHLORIDE SERPL-SCNC: 105 MMOL/L (ref 100–108)
CLARITY UR: CLEAR
CO2 SERPL-SCNC: 27 MMOL/L (ref 21–32)
CO2 SERPL-SCNC: 33 MMOL/L (ref 21–32)
CO2 SERPL-SCNC: 33 MMOL/L (ref 21–32)
COCAINE UR QL: NEGATIVE
COLOR UR: YELLOW
CREAT SERPL-MCNC: 1.12 MG/DL (ref 0.6–1.3)
CREAT SERPL-MCNC: 1.22 MG/DL (ref 0.6–1.3)
CREAT SERPL-MCNC: 1.25 MG/DL (ref 0.6–1.3)
EOSINOPHIL # BLD AUTO: 0.12 THOUSAND/ΜL (ref 0–0.61)
EOSINOPHIL NFR BLD AUTO: 2 % (ref 0–6)
ERYTHROCYTE [DISTWIDTH] IN BLOOD BY AUTOMATED COUNT: 13.6 % (ref 11.6–15.1)
ERYTHROCYTE [DISTWIDTH] IN BLOOD BY AUTOMATED COUNT: 13.6 % (ref 11.6–15.1)
ETHANOL SERPL-MCNC: <3 MG/DL (ref 0–3)
GFR SERPL CREATININE-BSD FRML MDRD: 39 ML/MIN/1.73SQ M
GFR SERPL CREATININE-BSD FRML MDRD: 40 ML/MIN/1.73SQ M
GFR SERPL CREATININE-BSD FRML MDRD: 45 ML/MIN/1.73SQ M
GLUCOSE SERPL-MCNC: 114 MG/DL (ref 65–140)
GLUCOSE SERPL-MCNC: 126 MG/DL (ref 65–140)
GLUCOSE SERPL-MCNC: 130 MG/DL (ref 65–140)
GLUCOSE SERPL-MCNC: 131 MG/DL (ref 65–140)
GLUCOSE SERPL-MCNC: 141 MG/DL (ref 65–140)
GLUCOSE SERPL-MCNC: 144 MG/DL (ref 65–140)
GLUCOSE SERPL-MCNC: 152 MG/DL (ref 65–140)
GLUCOSE SERPL-MCNC: 154 MG/DL (ref 65–140)
GLUCOSE SERPL-MCNC: 154 MG/DL (ref 65–140)
GLUCOSE SERPL-MCNC: 157 MG/DL (ref 65–140)
GLUCOSE SERPL-MCNC: 163 MG/DL (ref 65–140)
GLUCOSE SERPL-MCNC: 164 MG/DL (ref 65–140)
GLUCOSE SERPL-MCNC: 166 MG/DL (ref 65–140)
GLUCOSE SERPL-MCNC: 169 MG/DL (ref 65–140)
GLUCOSE SERPL-MCNC: 170 MG/DL (ref 65–140)
GLUCOSE SERPL-MCNC: 173 MG/DL (ref 65–140)
GLUCOSE SERPL-MCNC: 197 MG/DL (ref 65–140)
GLUCOSE SERPL-MCNC: 211 MG/DL (ref 65–140)
GLUCOSE UR STRIP-MCNC: NEGATIVE MG/DL
HCO3 BLDA-SCNC: 32.2 MMOL/L (ref 24–30)
HCT VFR BLD AUTO: 41.6 % (ref 34.8–46.1)
HCT VFR BLD AUTO: 45.1 % (ref 34.8–46.1)
HCT VFR BLD CALC: 41 % (ref 36.5–49.3)
HGB BLD-MCNC: 13.3 G/DL (ref 11.5–15.4)
HGB BLD-MCNC: 14.1 G/DL (ref 11.5–15.4)
HGB BLDA-MCNC: 13.9 G/DL (ref 12–17)
HGB UR QL STRIP.AUTO: NEGATIVE
IMM GRANULOCYTES # BLD AUTO: 0.06 THOUSAND/UL (ref 0–0.2)
IMM GRANULOCYTES NFR BLD AUTO: 1 % (ref 0–2)
KETONES UR STRIP-MCNC: ABNORMAL MG/DL
LEUKOCYTE ESTERASE UR QL STRIP: ABNORMAL
LYMPHOCYTES # BLD AUTO: 1.71 THOUSANDS/ΜL (ref 0.6–4.47)
LYMPHOCYTES NFR BLD AUTO: 24 % (ref 14–44)
MCH RBC QN AUTO: 29.4 PG (ref 26.8–34.3)
MCH RBC QN AUTO: 29.5 PG (ref 26.8–34.3)
MCHC RBC AUTO-ENTMCNC: 31.3 G/DL (ref 31.4–37.4)
MCHC RBC AUTO-ENTMCNC: 32 G/DL (ref 31.4–37.4)
MCV RBC AUTO: 92 FL (ref 82–98)
MCV RBC AUTO: 94 FL (ref 82–98)
METHADONE UR QL: NEGATIVE
MONOCYTES # BLD AUTO: 0.61 THOUSAND/ΜL (ref 0.17–1.22)
MONOCYTES NFR BLD AUTO: 8 % (ref 4–12)
MRSA NOSE QL CULT: NORMAL
NEUTROPHILS # BLD AUTO: 4.72 THOUSANDS/ΜL (ref 1.85–7.62)
NEUTS SEG NFR BLD AUTO: 65 % (ref 43–75)
NITRITE UR QL STRIP: NEGATIVE
NON-SQ EPI CELLS URNS QL MICRO: ABNORMAL /HPF
NRBC BLD AUTO-RTO: 0 /100 WBCS
OPIATES UR QL SCN: NEGATIVE
P AXIS: 44 DEGREES
P AXIS: 66 DEGREES
PCO2 BLD: 34 MMOL/L (ref 21–32)
PCO2 BLD: 44.4 MM HG (ref 42–50)
PCP UR QL: NEGATIVE
PH BLD: 7.47 [PH] (ref 7.3–7.4)
PH UR STRIP.AUTO: 5.5 [PH] (ref 4.5–8)
PHOSPHATE SERPL-MCNC: 3.6 MG/DL (ref 2.3–4.1)
PLATELET # BLD AUTO: 157 THOUSANDS/UL (ref 149–390)
PLATELET # BLD AUTO: 160 THOUSANDS/UL (ref 149–390)
PMV BLD AUTO: 12.5 FL (ref 8.9–12.7)
PMV BLD AUTO: 12.7 FL (ref 8.9–12.7)
PO2 BLD: 23 MM HG (ref 35–45)
POTASSIUM BLD-SCNC: 4.3 MMOL/L (ref 3.5–5.3)
POTASSIUM SERPL-SCNC: 3.5 MMOL/L (ref 3.5–5.3)
POTASSIUM SERPL-SCNC: 3.7 MMOL/L (ref 3.5–5.3)
POTASSIUM SERPL-SCNC: 5 MMOL/L (ref 3.5–5.3)
PR INTERVAL: 142 MS
PR INTERVAL: 156 MS
PROT SERPL-MCNC: 6.4 G/DL (ref 6.4–8.2)
PROT UR STRIP-MCNC: ABNORMAL MG/DL
QRS AXIS: 150 DEGREES
QRS AXIS: 31 DEGREES
QRSD INTERVAL: 132 MS
QRSD INTERVAL: 132 MS
QT INTERVAL: 410 MS
QT INTERVAL: 436 MS
QTC INTERVAL: 457 MS
QTC INTERVAL: 479 MS
RBC # BLD AUTO: 4.51 MILLION/UL (ref 3.81–5.12)
RBC # BLD AUTO: 4.8 MILLION/UL (ref 3.81–5.12)
RBC #/AREA URNS AUTO: ABNORMAL /HPF
SALICYLATES SERPL-MCNC: <3 MG/DL (ref 3–20)
SAO2 % BLD FROM PO2: 44 % (ref 60–85)
SODIUM BLD-SCNC: 139 MMOL/L (ref 136–145)
SODIUM SERPL-SCNC: 141 MMOL/L (ref 136–145)
SODIUM SERPL-SCNC: 141 MMOL/L (ref 136–145)
SODIUM SERPL-SCNC: 143 MMOL/L (ref 136–145)
SP GR UR STRIP.AUTO: 1.02 (ref 1–1.03)
SPECIMEN SOURCE: ABNORMAL
T WAVE AXIS: -6 DEGREES
T WAVE AXIS: 4 DEGREES
THC UR QL: NEGATIVE
TSH SERPL DL<=0.05 MIU/L-ACNC: 4.19 UIU/ML (ref 0.36–3.74)
UROBILINOGEN UR QL STRIP.AUTO: 0.2 E.U./DL
VENTRICULAR RATE: 66 BPM
VENTRICULAR RATE: 82 BPM
VIT B12 SERPL-MCNC: 1497 PG/ML (ref 100–900)
WBC # BLD AUTO: 7.25 THOUSAND/UL (ref 4.31–10.16)
WBC # BLD AUTO: 8.83 THOUSAND/UL (ref 4.31–10.16)
WBC #/AREA URNS AUTO: ABNORMAL /HPF

## 2019-01-01 PROCEDURE — 73552 X-RAY EXAM OF FEMUR 2/>: CPT

## 2019-01-01 PROCEDURE — 87081 CULTURE SCREEN ONLY: CPT | Performed by: PHYSICIAN ASSISTANT

## 2019-01-01 PROCEDURE — 93010 ELECTROCARDIOGRAM REPORT: CPT | Performed by: INTERNAL MEDICINE

## 2019-01-01 PROCEDURE — 82948 REAGENT STRIP/BLOOD GLUCOSE: CPT

## 2019-01-01 PROCEDURE — 99223 1ST HOSP IP/OBS HIGH 75: CPT | Performed by: INTERNAL MEDICINE

## 2019-01-01 PROCEDURE — 82947 ASSAY GLUCOSE BLOOD QUANT: CPT

## 2019-01-01 PROCEDURE — 99285 EMERGENCY DEPT VISIT HI MDM: CPT | Performed by: EMERGENCY MEDICINE

## 2019-01-01 PROCEDURE — 90715 TDAP VACCINE 7 YRS/> IM: CPT | Performed by: EMERGENCY MEDICINE

## 2019-01-01 PROCEDURE — 93005 ELECTROCARDIOGRAM TRACING: CPT

## 2019-01-01 PROCEDURE — 80307 DRUG TEST PRSMV CHEM ANLYZR: CPT | Performed by: EMERGENCY MEDICINE

## 2019-01-01 PROCEDURE — 97163 PT EVAL HIGH COMPLEX 45 MIN: CPT

## 2019-01-01 PROCEDURE — 99285 EMERGENCY DEPT VISIT HI MDM: CPT

## 2019-01-01 PROCEDURE — 84443 ASSAY THYROID STIM HORMONE: CPT | Performed by: PHYSICIAN ASSISTANT

## 2019-01-01 PROCEDURE — 99284 EMERGENCY DEPT VISIT MOD MDM: CPT

## 2019-01-01 PROCEDURE — 99214 OFFICE O/P EST MOD 30 MIN: CPT | Performed by: PSYCHIATRY & NEUROLOGY

## 2019-01-01 PROCEDURE — 82607 VITAMIN B-12: CPT | Performed by: PHYSICIAN ASSISTANT

## 2019-01-01 PROCEDURE — 99233 SBSQ HOSP IP/OBS HIGH 50: CPT | Performed by: PSYCHIATRY & NEUROLOGY

## 2019-01-01 PROCEDURE — 90471 IMMUNIZATION ADMIN: CPT

## 2019-01-01 PROCEDURE — 80329 ANALGESICS NON-OPIOID 1 OR 2: CPT | Performed by: EMERGENCY MEDICINE

## 2019-01-01 PROCEDURE — 70450 CT HEAD/BRAIN W/O DYE: CPT

## 2019-01-01 PROCEDURE — C9113 INJ PANTOPRAZOLE SODIUM, VIA: HCPCS | Performed by: PSYCHIATRY & NEUROLOGY

## 2019-01-01 PROCEDURE — 72170 X-RAY EXAM OF PELVIS: CPT

## 2019-01-01 PROCEDURE — 82330 ASSAY OF CALCIUM: CPT

## 2019-01-01 PROCEDURE — 85014 HEMATOCRIT: CPT

## 2019-01-01 PROCEDURE — 99232 SBSQ HOSP IP/OBS MODERATE 35: CPT | Performed by: INTERNAL MEDICINE

## 2019-01-01 PROCEDURE — G8981 BODY POS CURRENT STATUS: HCPCS

## 2019-01-01 PROCEDURE — 72125 CT NECK SPINE W/O DYE: CPT

## 2019-01-01 PROCEDURE — 99282 EMERGENCY DEPT VISIT SF MDM: CPT | Performed by: SURGERY

## 2019-01-01 PROCEDURE — NC001 PR NO CHARGE: Performed by: EMERGENCY MEDICINE

## 2019-01-01 PROCEDURE — G8982 BODY POS GOAL STATUS: HCPCS

## 2019-01-01 PROCEDURE — 84100 ASSAY OF PHOSPHORUS: CPT | Performed by: EMERGENCY MEDICINE

## 2019-01-01 PROCEDURE — 81001 URINALYSIS AUTO W/SCOPE: CPT

## 2019-01-01 PROCEDURE — 99221 1ST HOSP IP/OBS SF/LOW 40: CPT | Performed by: PSYCHIATRY & NEUROLOGY

## 2019-01-01 PROCEDURE — 82803 BLOOD GASES ANY COMBINATION: CPT

## 2019-01-01 PROCEDURE — 82140 ASSAY OF AMMONIA: CPT | Performed by: EMERGENCY MEDICINE

## 2019-01-01 PROCEDURE — 80053 COMPREHEN METABOLIC PANEL: CPT | Performed by: EMERGENCY MEDICINE

## 2019-01-01 PROCEDURE — 36415 COLL VENOUS BLD VENIPUNCTURE: CPT | Performed by: EMERGENCY MEDICINE

## 2019-01-01 PROCEDURE — 80320 DRUG SCREEN QUANTALCOHOLS: CPT | Performed by: EMERGENCY MEDICINE

## 2019-01-01 PROCEDURE — 1123F ACP DISCUSS/DSCN MKR DOCD: CPT | Performed by: HOSPITALIST

## 2019-01-01 PROCEDURE — 80048 BASIC METABOLIC PNL TOTAL CA: CPT | Performed by: INTERNAL MEDICINE

## 2019-01-01 PROCEDURE — 80048 BASIC METABOLIC PNL TOTAL CA: CPT | Performed by: PHYSICIAN ASSISTANT

## 2019-01-01 PROCEDURE — 84295 ASSAY OF SERUM SODIUM: CPT

## 2019-01-01 PROCEDURE — 85025 COMPLETE CBC W/AUTO DIFF WBC: CPT | Performed by: EMERGENCY MEDICINE

## 2019-01-01 PROCEDURE — 99239 HOSP IP/OBS DSCHRG MGMT >30: CPT | Performed by: HOSPITALIST

## 2019-01-01 PROCEDURE — 85027 COMPLETE CBC AUTOMATED: CPT | Performed by: PHYSICIAN ASSISTANT

## 2019-01-01 PROCEDURE — 84132 ASSAY OF SERUM POTASSIUM: CPT

## 2019-01-01 PROCEDURE — 99232 SBSQ HOSP IP/OBS MODERATE 35: CPT | Performed by: HOSPITALIST

## 2019-01-01 RX ORDER — METOPROLOL SUCCINATE 100 MG/1
100 TABLET, EXTENDED RELEASE ORAL DAILY
COMMUNITY

## 2019-01-01 RX ORDER — ARIPIPRAZOLE 5 MG/1
10 TABLET ORAL DAILY
Status: DISCONTINUED | OUTPATIENT
Start: 2019-01-01 | End: 2019-01-01 | Stop reason: HOSPADM

## 2019-01-01 RX ORDER — LANOLIN ALCOHOL/MO/W.PET/CERES
3 CREAM (GRAM) TOPICAL
Status: DISCONTINUED | OUTPATIENT
Start: 2019-01-01 | End: 2019-01-01 | Stop reason: HOSPADM

## 2019-01-01 RX ORDER — AMLODIPINE BESYLATE 2.5 MG/1
2.5 TABLET ORAL DAILY
Qty: 30 TABLET | Refills: 0
Start: 2019-01-01

## 2019-01-01 RX ORDER — AMLODIPINE BESYLATE 2.5 MG/1
7.5 TABLET ORAL DAILY
COMMUNITY

## 2019-01-01 RX ORDER — HYDRALAZINE HYDROCHLORIDE 20 MG/ML
10 INJECTION INTRAMUSCULAR; INTRAVENOUS EVERY 6 HOURS PRN
Status: DISCONTINUED | OUTPATIENT
Start: 2019-01-01 | End: 2019-01-01 | Stop reason: HOSPADM

## 2019-01-01 RX ORDER — RIVASTIGMINE TARTRATE 3 MG/1
3 CAPSULE ORAL 2 TIMES DAILY WITH MEALS
Qty: 60 CAPSULE | Refills: 0
Start: 2019-01-01 | End: 2020-01-01

## 2019-01-01 RX ORDER — CYCLOSPORINE 0.5 MG/ML
1 EMULSION OPHTHALMIC 2 TIMES DAILY
COMMUNITY

## 2019-01-01 RX ORDER — PANTOPRAZOLE SODIUM 40 MG/1
40 INJECTION, POWDER, FOR SOLUTION INTRAVENOUS
Status: DISCONTINUED | OUTPATIENT
Start: 2019-01-01 | End: 2019-01-01 | Stop reason: HOSPADM

## 2019-01-01 RX ORDER — FUROSEMIDE 20 MG/1
20 TABLET ORAL DAILY
COMMUNITY

## 2019-01-01 RX ORDER — AMLODIPINE BESYLATE 2.5 MG/1
2.5 TABLET ORAL DAILY
Status: DISCONTINUED | OUTPATIENT
Start: 2019-01-01 | End: 2019-01-01

## 2019-01-01 RX ORDER — PRAVASTATIN SODIUM 40 MG
40 TABLET ORAL
Status: DISCONTINUED | OUTPATIENT
Start: 2019-01-01 | End: 2019-01-01 | Stop reason: HOSPADM

## 2019-01-01 RX ORDER — ASPIRIN 81 MG/1
81 TABLET, CHEWABLE ORAL DAILY
COMMUNITY

## 2019-01-01 RX ORDER — ONDANSETRON 2 MG/ML
4 INJECTION INTRAMUSCULAR; INTRAVENOUS ONCE
Status: COMPLETED | OUTPATIENT
Start: 2019-01-01 | End: 2019-01-01

## 2019-01-01 RX ORDER — CYCLOSPORINE 0.5 MG/ML
EMULSION OPHTHALMIC 2 TIMES DAILY
COMMUNITY

## 2019-01-01 RX ORDER — CALCIUM CARBONATE 200(500)MG
1000 TABLET,CHEWABLE ORAL DAILY PRN
Status: DISCONTINUED | OUTPATIENT
Start: 2019-01-01 | End: 2019-01-01 | Stop reason: HOSPADM

## 2019-01-01 RX ORDER — FUROSEMIDE 20 MG/1
20 TABLET ORAL DAILY
Status: DISCONTINUED | OUTPATIENT
Start: 2019-01-01 | End: 2019-01-01

## 2019-01-01 RX ORDER — SENNOSIDES 8.6 MG
1 TABLET ORAL DAILY
Status: DISCONTINUED | OUTPATIENT
Start: 2019-01-01 | End: 2019-01-01 | Stop reason: SDUPTHER

## 2019-01-01 RX ORDER — RIVASTIGMINE TARTRATE 1.5 MG/1
3 CAPSULE ORAL 2 TIMES DAILY WITH MEALS
Status: DISCONTINUED | OUTPATIENT
Start: 2019-01-01 | End: 2019-01-01 | Stop reason: HOSPADM

## 2019-01-01 RX ORDER — AMLODIPINE BESYLATE 2.5 MG/1
2.5 TABLET ORAL DAILY
Status: DISCONTINUED | OUTPATIENT
Start: 2019-01-01 | End: 2019-01-01 | Stop reason: HOSPADM

## 2019-01-01 RX ORDER — GINSENG 100 MG
1 CAPSULE ORAL 2 TIMES DAILY
Status: DISCONTINUED | OUTPATIENT
Start: 2019-01-01 | End: 2019-01-01 | Stop reason: HOSPADM

## 2019-01-01 RX ORDER — CALCIUM CARBONATE 200(500)MG
1000 TABLET,CHEWABLE ORAL DAILY PRN
Qty: 30 TABLET | Refills: 0
Start: 2019-01-01 | End: 2020-01-01

## 2019-01-01 RX ORDER — POLYETHYLENE GLYCOL 3350 17 G/17G
17 POWDER, FOR SOLUTION ORAL DAILY
Status: DISCONTINUED | OUTPATIENT
Start: 2019-01-01 | End: 2019-01-01

## 2019-01-01 RX ORDER — CHOLECALCIFEROL (VITAMIN D3) 125 MCG
500 CAPSULE ORAL DAILY
COMMUNITY

## 2019-01-01 RX ORDER — METOPROLOL SUCCINATE 100 MG/1
100 TABLET, EXTENDED RELEASE ORAL
Status: DISCONTINUED | OUTPATIENT
Start: 2019-01-01 | End: 2019-01-01 | Stop reason: HOSPADM

## 2019-01-01 RX ORDER — AMLODIPINE BESYLATE 5 MG/1
5 TABLET ORAL DAILY
Status: DISCONTINUED | OUTPATIENT
Start: 2019-01-01 | End: 2019-01-01 | Stop reason: HOSPADM

## 2019-01-01 RX ORDER — DIPHENHYDRAMINE HYDROCHLORIDE 50 MG/ML
50 INJECTION INTRAMUSCULAR; INTRAVENOUS ONCE
Status: COMPLETED | OUTPATIENT
Start: 2019-01-01 | End: 2019-01-01

## 2019-01-01 RX ORDER — MELATONIN
2000 DAILY
COMMUNITY

## 2019-01-01 RX ORDER — ASPIRIN 81 MG/1
81 TABLET ORAL DAILY
Status: DISCONTINUED | OUTPATIENT
Start: 2019-01-01 | End: 2019-01-01 | Stop reason: HOSPADM

## 2019-01-01 RX ORDER — AMLODIPINE BESYLATE 5 MG/1
5 TABLET ORAL DAILY
Qty: 30 TABLET | Refills: 0
Start: 2019-01-01

## 2019-01-01 RX ORDER — SENNOSIDES 8.6 MG
1 TABLET ORAL 2 TIMES DAILY
Status: DISCONTINUED | OUTPATIENT
Start: 2019-01-01 | End: 2019-01-01

## 2019-01-01 RX ORDER — SENNA PLUS 8.6 MG/1
1 TABLET ORAL 2 TIMES DAILY
COMMUNITY

## 2019-01-01 RX ORDER — ERGOCALCIFEROL 1.25 MG/1
50000 CAPSULE ORAL WEEKLY
COMMUNITY

## 2019-01-01 RX ORDER — AMLODIPINE BESYLATE 2.5 MG/1
2.5 TABLET ORAL DAILY
COMMUNITY
End: 2019-01-01 | Stop reason: HOSPADM

## 2019-01-01 RX ORDER — GUAIFENESIN 100 MG/5ML
200 SOLUTION ORAL 4 TIMES DAILY PRN
Status: DISCONTINUED | OUTPATIENT
Start: 2019-01-01 | End: 2019-01-01 | Stop reason: HOSPADM

## 2019-01-01 RX ORDER — SIMVASTATIN 20 MG
20 TABLET ORAL
COMMUNITY

## 2019-01-01 RX ORDER — ONDANSETRON 2 MG/ML
4 INJECTION INTRAMUSCULAR; INTRAVENOUS EVERY 6 HOURS PRN
Status: DISCONTINUED | OUTPATIENT
Start: 2019-01-01 | End: 2019-01-01 | Stop reason: HOSPADM

## 2019-01-01 RX ORDER — MELATONIN
1000 DAILY
Status: DISCONTINUED | OUTPATIENT
Start: 2019-01-01 | End: 2019-01-01 | Stop reason: HOSPADM

## 2019-01-01 RX ORDER — ACETAMINOPHEN 325 MG/1
325 TABLET ORAL EVERY 4 HOURS PRN
Status: DISCONTINUED | OUTPATIENT
Start: 2019-01-01 | End: 2019-01-01 | Stop reason: HOSPADM

## 2019-01-01 RX ORDER — POLYVINYL ALCOHOL 14 MG/ML
1 SOLUTION/ DROPS OPHTHALMIC EVERY 12 HOURS SCHEDULED
Status: DISCONTINUED | OUTPATIENT
Start: 2019-01-01 | End: 2019-01-01

## 2019-01-01 RX ORDER — AMOXICILLIN 250 MG
2 CAPSULE ORAL 2 TIMES DAILY
Status: DISCONTINUED | OUTPATIENT
Start: 2019-01-01 | End: 2019-01-01 | Stop reason: HOSPADM

## 2019-01-01 RX ORDER — ARIPIPRAZOLE 5 MG/1
5 TABLET ORAL DAILY
COMMUNITY

## 2019-01-01 RX ADMIN — METOPROLOL SUCCINATE 100 MG: 100 TABLET, EXTENDED RELEASE ORAL at 21:35

## 2019-01-01 RX ADMIN — DEXTRAN 70 AND HYPROMELLOSE 2910 1 DROP: 1; 3 SOLUTION/ DROPS OPHTHALMIC at 10:22

## 2019-01-01 RX ADMIN — PRAVASTATIN SODIUM 40 MG: 40 TABLET ORAL at 17:52

## 2019-01-01 RX ADMIN — CARBIDOPA AND LEVODOPA 2 TABLET: 25; 100 TABLET ORAL at 10:20

## 2019-01-01 RX ADMIN — RIVASTIGMINE TARTRATE 3 MG: 1.5 CAPSULE ORAL at 16:31

## 2019-01-01 RX ADMIN — SENNOSIDES 8.6 MG: 8.6 TABLET, FILM COATED ORAL at 09:37

## 2019-01-01 RX ADMIN — BACITRACIN 1 SMALL APPLICATION: 500 OINTMENT TOPICAL at 10:48

## 2019-01-01 RX ADMIN — CARBIDOPA AND LEVODOPA 2.5 TABLET: 25; 100 TABLET ORAL at 17:23

## 2019-01-01 RX ADMIN — ASPIRIN 81 MG: 81 TABLET, COATED ORAL at 09:37

## 2019-01-01 RX ADMIN — MELATONIN 3 MG: at 21:35

## 2019-01-01 RX ADMIN — INSULIN LISPRO 1 UNITS: 100 INJECTION, SOLUTION INTRAVENOUS; SUBCUTANEOUS at 18:05

## 2019-01-01 RX ADMIN — CARBIDOPA AND LEVODOPA 2.5 TABLET: 25; 100 TABLET ORAL at 09:32

## 2019-01-01 RX ADMIN — TETANUS TOXOID, REDUCED DIPHTHERIA TOXOID AND ACELLULAR PERTUSSIS VACCINE, ADSORBED 0.5 ML: 5; 2.5; 8; 8; 2.5 SUSPENSION INTRAMUSCULAR at 10:48

## 2019-01-01 RX ADMIN — CARBIDOPA AND LEVODOPA 1 TABLET: 25; 100 TABLET ORAL at 23:38

## 2019-01-01 RX ADMIN — ONDANSETRON 4 MG: 2 INJECTION INTRAMUSCULAR; INTRAVENOUS at 22:48

## 2019-01-01 RX ADMIN — CYANOCOBALAMIN TAB 500 MCG 500 MCG: 500 TAB at 21:18

## 2019-01-01 RX ADMIN — DEXTRAN 70 AND HYPROMELLOSE 2910 1 DROP: 1; 3 SOLUTION/ DROPS OPHTHALMIC at 08:57

## 2019-01-01 RX ADMIN — ENOXAPARIN SODIUM 30 MG: 30 INJECTION SUBCUTANEOUS at 08:06

## 2019-01-01 RX ADMIN — METOPROLOL SUCCINATE 100 MG: 100 TABLET, EXTENDED RELEASE ORAL at 22:05

## 2019-01-01 RX ADMIN — ARIPIPRAZOLE 10 MG: 5 TABLET ORAL at 10:18

## 2019-01-01 RX ADMIN — POLYETHYLENE GLYCOL 3350 17 G: 17 POWDER, FOR SOLUTION ORAL at 09:38

## 2019-01-01 RX ADMIN — INSULIN LISPRO 1 UNITS: 100 INJECTION, SOLUTION INTRAVENOUS; SUBCUTANEOUS at 22:21

## 2019-01-01 RX ADMIN — CARBIDOPA AND LEVODOPA 2.5 TABLET: 25; 100 TABLET ORAL at 08:05

## 2019-01-01 RX ADMIN — SENNOSIDES 8.6 MG: 8.6 TABLET, FILM COATED ORAL at 18:05

## 2019-01-01 RX ADMIN — CARBIDOPA AND LEVODOPA 2.5 TABLET: 25; 100 TABLET ORAL at 13:05

## 2019-01-01 RX ADMIN — AMLODIPINE BESYLATE 5 MG: 5 TABLET ORAL at 09:37

## 2019-01-01 RX ADMIN — CARBIDOPA AND LEVODOPA 2 TABLET: 25; 100 TABLET ORAL at 18:05

## 2019-01-01 RX ADMIN — RIVASTIGMINE TARTRATE 3 MG: 1.5 CAPSULE ORAL at 10:26

## 2019-01-01 RX ADMIN — CARBIDOPA AND LEVODOPA 2.5 TABLET: 25; 100 TABLET ORAL at 14:15

## 2019-01-01 RX ADMIN — AMLODIPINE BESYLATE 5 MG: 5 TABLET ORAL at 10:17

## 2019-01-01 RX ADMIN — MELATONIN 1000 UNITS: at 09:31

## 2019-01-01 RX ADMIN — ONDANSETRON 4 MG: 2 INJECTION INTRAMUSCULAR; INTRAVENOUS at 20:09

## 2019-01-01 RX ADMIN — SENNOSIDES AND DOCUSATE SODIUM 2 TABLET: 8.6; 5 TABLET ORAL at 08:50

## 2019-01-01 RX ADMIN — AMLODIPINE BESYLATE 2.5 MG: 2.5 TABLET ORAL at 17:55

## 2019-01-01 RX ADMIN — RIVASTIGMINE TARTRATE 3 MG: 1.5 CAPSULE ORAL at 08:05

## 2019-01-01 RX ADMIN — PANTOPRAZOLE SODIUM 40 MG: 40 INJECTION, POWDER, FOR SOLUTION INTRAVENOUS at 08:06

## 2019-01-01 RX ADMIN — SENNOSIDES AND DOCUSATE SODIUM 2 TABLET: 8.6; 5 TABLET ORAL at 08:06

## 2019-01-01 RX ADMIN — ARIPIPRAZOLE 10 MG: 5 TABLET ORAL at 09:33

## 2019-01-01 RX ADMIN — CYANOCOBALAMIN TAB 500 MCG 500 MCG: 500 TAB at 21:17

## 2019-01-01 RX ADMIN — DEXTRAN 70 AND HYPROMELLOSE 2910 1 DROP: 1; 3 SOLUTION/ DROPS OPHTHALMIC at 09:47

## 2019-01-01 RX ADMIN — RIVASTIGMINE TARTRATE 3 MG: 1.5 CAPSULE ORAL at 08:50

## 2019-01-01 RX ADMIN — ENOXAPARIN SODIUM 30 MG: 30 INJECTION SUBCUTANEOUS at 08:50

## 2019-01-01 RX ADMIN — CARBIDOPA AND LEVODOPA 2 TABLET: 25; 100 TABLET ORAL at 12:03

## 2019-01-01 RX ADMIN — CYANOCOBALAMIN TAB 500 MCG 500 MCG: 500 TAB at 21:35

## 2019-01-01 RX ADMIN — CARBIDOPA AND LEVODOPA 2 TABLET: 25; 100 TABLET ORAL at 10:15

## 2019-01-01 RX ADMIN — RIVASTIGMINE TARTRATE 3 MG: 1.5 CAPSULE ORAL at 17:47

## 2019-01-01 RX ADMIN — INSULIN LISPRO 1 UNITS: 100 INJECTION, SOLUTION INTRAVENOUS; SUBCUTANEOUS at 12:43

## 2019-01-01 RX ADMIN — ASPIRIN 81 MG: 81 TABLET, COATED ORAL at 08:50

## 2019-01-01 RX ADMIN — RIVASTIGMINE TARTRATE 3 MG: 1.5 CAPSULE ORAL at 09:32

## 2019-01-01 RX ADMIN — DEXTRAN 70 AND HYPROMELLOSE 2910 1 DROP: 1; 3 SOLUTION/ DROPS OPHTHALMIC at 20:23

## 2019-01-01 RX ADMIN — PRAVASTATIN SODIUM 40 MG: 40 TABLET ORAL at 17:20

## 2019-01-01 RX ADMIN — FUROSEMIDE 20 MG: 20 TABLET ORAL at 10:20

## 2019-01-01 RX ADMIN — METOPROLOL SUCCINATE 100 MG: 100 TABLET, EXTENDED RELEASE ORAL at 21:18

## 2019-01-01 RX ADMIN — ENOXAPARIN SODIUM 30 MG: 30 INJECTION SUBCUTANEOUS at 10:20

## 2019-01-01 RX ADMIN — PRAVASTATIN SODIUM 40 MG: 40 TABLET ORAL at 18:04

## 2019-01-01 RX ADMIN — ENOXAPARIN SODIUM 30 MG: 30 INJECTION SUBCUTANEOUS at 09:38

## 2019-01-01 RX ADMIN — MELATONIN 3 MG: at 21:19

## 2019-01-01 RX ADMIN — ARIPIPRAZOLE 10 MG: 5 TABLET ORAL at 08:05

## 2019-01-01 RX ADMIN — INSULIN LISPRO 1 UNITS: 100 INJECTION, SOLUTION INTRAVENOUS; SUBCUTANEOUS at 22:10

## 2019-01-01 RX ADMIN — ASPIRIN 81 MG: 81 TABLET, COATED ORAL at 08:06

## 2019-01-01 RX ADMIN — MELATONIN 1000 UNITS: at 08:50

## 2019-01-01 RX ADMIN — PANTOPRAZOLE SODIUM 40 MG: 40 INJECTION, POWDER, FOR SOLUTION INTRAVENOUS at 08:50

## 2019-01-01 RX ADMIN — SENNOSIDES 8.6 MG: 8.6 TABLET, FILM COATED ORAL at 10:17

## 2019-01-01 RX ADMIN — INSULIN LISPRO 1 UNITS: 100 INJECTION, SOLUTION INTRAVENOUS; SUBCUTANEOUS at 10:22

## 2019-01-01 RX ADMIN — ARIPIPRAZOLE 10 MG: 5 TABLET ORAL at 08:51

## 2019-01-01 RX ADMIN — ONDANSETRON 4 MG: 2 INJECTION INTRAMUSCULAR; INTRAVENOUS at 14:03

## 2019-01-01 RX ADMIN — ONDANSETRON 4 MG: 2 INJECTION INTRAMUSCULAR; INTRAVENOUS at 09:03

## 2019-01-01 RX ADMIN — DIPHENHYDRAMINE HYDROCHLORIDE 50 MG: 50 INJECTION, SOLUTION INTRAMUSCULAR; INTRAVENOUS at 01:33

## 2019-01-01 RX ADMIN — SENNOSIDES 8.6 MG: 8.6 TABLET, FILM COATED ORAL at 17:40

## 2019-01-01 RX ADMIN — MELATONIN 1000 UNITS: at 08:06

## 2019-01-01 RX ADMIN — CARBIDOPA AND LEVODOPA 2.5 TABLET: 25; 100 TABLET ORAL at 14:44

## 2019-01-01 RX ADMIN — SENNOSIDES AND DOCUSATE SODIUM 2 TABLET: 8.6; 5 TABLET ORAL at 17:52

## 2019-01-01 RX ADMIN — MELATONIN 3 MG: at 22:05

## 2019-01-01 RX ADMIN — INSULIN LISPRO 1 UNITS: 100 INJECTION, SOLUTION INTRAVENOUS; SUBCUTANEOUS at 13:26

## 2019-01-01 RX ADMIN — METOPROLOL SUCCINATE 100 MG: 100 TABLET, EXTENDED RELEASE ORAL at 21:17

## 2019-01-01 RX ADMIN — MELATONIN 1000 UNITS: at 10:19

## 2019-01-01 RX ADMIN — ASPIRIN 81 MG: 81 TABLET, COATED ORAL at 10:18

## 2019-01-01 RX ADMIN — PANTOPRAZOLE SODIUM 40 MG: 40 INJECTION, POWDER, FOR SOLUTION INTRAVENOUS at 12:24

## 2019-01-01 RX ADMIN — RIVASTIGMINE TARTRATE 3 MG: 1.5 CAPSULE ORAL at 18:04

## 2019-01-01 RX ADMIN — AMLODIPINE BESYLATE 5 MG: 5 TABLET ORAL at 08:05

## 2019-01-01 RX ADMIN — POLYETHYLENE GLYCOL 3350 17 G: 17 POWDER, FOR SOLUTION ORAL at 10:17

## 2019-01-01 RX ADMIN — CARBIDOPA AND LEVODOPA 2.5 TABLET: 25; 100 TABLET ORAL at 08:51

## 2019-01-01 RX ADMIN — ONDANSETRON 4 MG: 2 INJECTION INTRAMUSCULAR; INTRAVENOUS at 12:03

## 2019-01-01 RX ADMIN — CARBIDOPA AND LEVODOPA 2 TABLET: 25; 100 TABLET ORAL at 17:50

## 2019-01-01 RX ADMIN — INSULIN LISPRO 1 UNITS: 100 INJECTION, SOLUTION INTRAVENOUS; SUBCUTANEOUS at 21:40

## 2019-01-01 RX ADMIN — AMLODIPINE BESYLATE 2.5 MG: 2.5 TABLET ORAL at 08:06

## 2019-01-01 RX ADMIN — AMLODIPINE BESYLATE 5 MG: 5 TABLET ORAL at 08:50

## 2019-01-01 RX ADMIN — PRAVASTATIN SODIUM 40 MG: 40 TABLET ORAL at 16:31

## 2019-01-01 RX ADMIN — CARBIDOPA AND LEVODOPA 2 TABLET: 25; 100 TABLET ORAL at 16:30

## 2019-01-01 RX ADMIN — INSULIN LISPRO 1 UNITS: 100 INJECTION, SOLUTION INTRAVENOUS; SUBCUTANEOUS at 21:22

## 2019-05-16 PROBLEM — R26.9 NEUROLOGIC GAIT DYSFUNCTION: Status: ACTIVE | Noted: 2019-01-01

## 2019-11-25 PROBLEM — G25.81 RESTLESS LEG SYNDROME: Status: ACTIVE | Noted: 2019-01-01

## 2019-11-25 NOTE — ASSESSMENT & PLAN NOTE
80-year-old woman with Parkinson's disease, symptomatic from most 20 years  Overall she seems to be doing relatively well  She did find a small improvement in her walking with an increased dose of the Sinemet  No return of her hallucinations  No dyskinesias    Her medications last about 3 hours which corresponds to her dosing regimen   - keep sinemet unchanged for now   - consider Mason Rascon in the future or entacapone   - PT for forming contractures   - Iron studies for RLS

## 2019-11-25 NOTE — PROGRESS NOTES
Assessment/Plan:    Parkinson's disease [facility])  59-year-old woman with Parkinson's disease, symptomatic from most 20 years  Overall she seems to be doing relatively well  She did find a small improvement in her walking with an increased dose of the Sinemet  No return of her hallucinations  No dyskinesias  Her medications last about 3 hours which corresponds to her dosing regimen   - keep sinemet unchanged for now   - consider Anurag Dart in the future or entacapone   - PT for forming contractures   - Iron studies for RLS       Diagnoses and all orders for this visit:    Parkinson's disease ([facility] [address] )    Restless leg syndrome  -     Iron Panel (Includes Ferritin, Iron Sat%, Iron, and TIBC); Future  -     Ferritin; Future    Other orders  -     cycloSPORINE (RESTASIS) 0 05 % ophthalmic emulsion; Administer to both eyes 2 (two) times a day   -     amLODIPine (NORVASC) 2 5 mg tablet; Take 2 5 mg by mouth daily        Subjective:     Patient ID: Gabriela García is a 80 y o  female  I had the pleasure of seeing your patient, Gabriela García in the [address] [place] [address] at the [facility]  Gabriela García is an 80year old [place] [address] resident who presents in follow up for Parkinson's disease, symptom onset in 2000 with left hand tremor now complicated by hallucinations well controlled on abilify and prominent postural instability  She was initially followed by Dr Seema Pulliam after her diagnosis in 2000 and then Dr Shivani Simmons followed by Dr Trenton Eisenmenger  She appears to be sinemet responsive  Her sinemet has been slowly increased over the years  She developed hallucinations in 2010 and was started on Abilify at [facility] which did help  Her family has not wanted to take her off of the Abilify because she was doing so poorly when she had the hallucinations in the past (refusing to eat ect)  In 2008 she moved to a long term care facility  She has been largely wheelchair-bound since prior to 2018  When she was last here we increase the dose of her Sinemet from 2 to 2 5 tablets 4 times per day        Current medications and timing:  - Sinemet 25/100; 2 5 tabs, 4 times per day @ 7:30am, 10:30am, 1:30pm, 4:30pm      Prior medication trials:   Amantadine - was concerned that it might cause hallucinations   Requip - Drowsiness   Sinemet - Nausea and vomiting initially     Interval history:  Walking seemed easier with the increased dose of sinemet  In the chair most of the day  Restless legs at night can be a bother but this is not bothersome enough to warrant adding a medication to her regimen  Trouble with dry eyes  Did PT for a bit which seemed to help  Now doing exercise classes a few times per week  Regarding motor symptoms:   Tremor: well controlled  Seems to be less and less of an issue over time  Changes in gait: walk with a walker and assistance  Falls: a couple of days ago slipped out of the recliner that she sleeps in  Freezing: yes  Trouble with swallowing: stable, modified diet    Regarding non-motor symptoms:   Mood: "im fine" gets nervous sometimes  Comes and goes for no reason  Sleep: no issues   Memory trouble: good  Hallucinations: some presence hallucinations  Physical activity: some exercise classes  POA: Janae Basilio, son and daughter       Regarding medication complications:  Wearing off: 3 hours   Dyskinesias: no       The following portions of the patient's history were reviewed and updated as appropriate: allergies, current medications, past family history, past medical history, past social history, past surgical history and problem list       Objective:  /65 (BP Location: Left arm, Patient Position: Sitting, Cuff Size: Standard)   Pulse 65   Ht 5' (1 524 m)   Wt 99 8 kg (220 lb)   BMI 42 97 kg/m²     Physical Exam    Neurological Exam     UPDRS motor:                              Time since last dose:  2 5     Speech  2     Facial Expression  4 Rigidity - Neck  2     Rigidity - Upper Extremity (Right)  1     Rigidity - Upper Extremity (Left)   2     Rigidity - Lower Extremity (Right)  3     Rigidity - Lower Extremity (Left)   3     Finger Taps (Right)   3     Finger Taps (Left)   3 L>R    Hand Movement (Right)  2     Hand Movement (Left)   4     Pronation/Supination (Right)  6     Pronation/Supination (Left)   4     Toe Tapping (Right) 3     Toe Tapping (Left) 3     Leg Agility (Right)  3     Leg Agility (Left)   3     Arising from Chair   x     Gait   x     Freezing of Gait x     Postural Stability   x     Posture x     Global spontaneity of movement 3     Postural Tremor (Right) 0     Postural Tremor (Left) 0     Kinetic Tremor (Right)  0     Kinetic Tremor (Left)  0     Rest tremor amplitude RUE 0     Rest tremor amplitude LUE 2 (fleeting)      Rest tremor amplitude RLE 0     Reset tremor amplitude LLE 0     Lip/Jaw Tremor  2     Consistency of tremor 3 (Jaw)      Motor Exam Total:            Review of Systems   Constitutional: Negative  Negative for appetite change and fever  HENT: Negative  Negative for hearing loss, tinnitus, trouble swallowing and voice change  Eyes: Negative  Negative for photophobia and pain  Respiratory: Negative  Negative for shortness of breath  Cardiovascular: Negative  Negative for palpitations  Gastrointestinal: Negative  Negative for nausea and vomiting  Endocrine: Negative  Negative for cold intolerance and heat intolerance  Genitourinary: Negative  Negative for dysuria, frequency and urgency  Musculoskeletal: Positive for gait problem  Negative for myalgias and neck pain  Skin: Negative  Negative for rash  Neurological: Positive for tremors (hands) and speech difficulty (voice changing)  Negative for dizziness, seizures, syncope, facial asymmetry, weakness, light-headedness, numbness and headaches  Hematological: Negative  Does not bruise/bleed easily     Psychiatric/Behavioral: Positive for sleep disturbance  Negative for confusion and hallucinations  The above ROS was reviewed and updated       Carla Finn MD  Medical Director   [facility]  Movement [facility] Memory Specialist       Current Outpatient Medications on File Prior to Visit   Medication Sig Dispense Refill    acetaminophen (TYLENOL) 325 mg tablet Take 325 mg by mouth every 4 (four) hours as needed for mild pain      Alcohol Swabs (CVS ALCOHOL PREP SWABS) 70 % PADS by Does not apply route daily as needed      amLODIPine (NORVASC) 2 5 mg tablet Take 2 5 mg by mouth daily      amLODIPine (NORVASC) 5 mg tablet Take 5 mg by mouth daily At bedtime       ARIPiprazole (ABILIFY) 5 mg tablet Take 1 tablet by mouth daily      aspirin 81 MG tablet Take 81 mg by mouth daily at 9 am       carbidopa-levodopa (SINEMET)  mg per tablet Take 2 5 tablets by mouth 4 (four) times a day 7;30 am, 10;30, am, 1:30 pm, and 4:30 pm       Cholecalciferol (VITAMIN D3) 1000 units CAPS Take 2 tablets by mouth daily At 9 pm       cyanocobalamin (VITAMIN B-12) 500 mcg tablet Take 1 tablet by mouth daily At 9 pm       cycloSPORINE (RESTASIS) 0 05 % ophthalmic emulsion Administer to both eyes 2 (two) times a day       Dimethicone (REMEDY SKIN REPAIR) 1 5 % CREA Apply topically daily as needed      Ergocalciferol (VITAMIN D2 PO) Take by mouth 50,000 take one cap by mouth every wednesday      furosemide (LASIX) 40 mg tablet Take 20 mg by mouth daily       guaiFENesin (ROBITUSSIN) 100 MG/5ML oral liquid Take 200 mg by mouth 4 (four) times a day as needed for cough      insulin aspart (NOVOLOG) 100 units/mL injection Novolog U-100 Insulin aspart 100 unit/mL subcutaneous solution      Insulin Syringes, Disposable, (MONOJECT INS SYR 1CC) U-100 1 ML MISC 10 Units by Does not apply route 2 (two) times a day 7 am and 4 pm      metoprolol succinate (TOPROL-XL) 100 mg 24 hr tablet Take 1 tablet by mouth daily at bedtime      nystatin (MYCOSTATIN) 100,000 units/mL suspension Apply 500,000 Units to the mouth or throat 4 (four) times a day      polyethylene glycol (MIRALAX) 17 g packet Take 17 g by mouth daily      Sennosides (SENNA-TABS PO) Take 100 mg by mouth 2 (two) times a day 9 am and 9 pm      simvastatin (ZOCOR) 20 mg tablet Take 20 mg by mouth daily At 9 pm       sitaGLIPtin (JANUVIA) 50 mg tablet Take 1 tablet by mouth 2 (two) times a day Once at 9 am and at 5 pm        No current facility-administered medications on file prior to visit

## 2019-12-10 NOTE — TELEPHONE ENCOUNTER
ELSIE  Patient's daughter Asia Hernandezsebastian states patient resides at [facility]  They reported to her that the patient has been complaining of dizziness and her hallucinations have returned  Asia Pena states the patient has not had hallucinations for 10 years  States there have been no medication changes  She states patient is being admitted at [address] [place] [address]  She states they did check for infection and this has been negative so far along with imaging that was completed  She states patient is now having trouble balancing herself when she walks  Asia Pena states she just wanted to update Dr Severo Coleman

## 2019-12-14 PROBLEM — R44.1 VISUAL HALLUCINATIONS: Status: ACTIVE | Noted: 2019-01-01

## 2019-12-14 PROBLEM — R01.1 HOLOSYSTOLIC MURMUR: Status: ACTIVE | Noted: 2019-01-01

## 2019-12-14 PROBLEM — E11.9 TYPE 2 DIABETES MELLITUS, WITH LONG-TERM CURRENT USE OF INSULIN (HCC): Status: ACTIVE | Noted: 2019-01-01

## 2019-12-14 PROBLEM — Z79.4 TYPE 2 DIABETES MELLITUS, WITH LONG-TERM CURRENT USE OF INSULIN (HCC): Status: ACTIVE | Noted: 2019-01-01

## 2019-12-14 PROBLEM — R01.1 SYSTOLIC EJECTION MURMUR: Status: ACTIVE | Noted: 2019-01-01

## 2019-12-14 NOTE — ASSESSMENT & PLAN NOTE
· Patient reports visual hallucinations starting the evening of 12/9/2019  Patient describes hallucinations as either small children were grown adult men  She states that the children do not bother her or threaten her, but the adult men do make threatening gestures toward her  Neither hallucinations talk to her, and when she speak to them they do not respond  Previously, had hallucinations approximately 10 years ago was placed on Abilify with improvement of hallucinations  It is entirely unclear if hallucinations are truly only new over the last 5 days versus have been ongoing for some period of time and patient has not been alerting staff at assisted living facility  · Suspect 2/2 known Parkinson's dz  · She has already been evaluated at [facility] and had undergone infectious workup that was overall negative  CT scan of the head was within normal limits    · Additionally will check TSH and B12  · Delirium precautions  · Continue abilify 10mg PO QHS  · Appreciate neurology evaluation-- appreciate input; consider behavioral health consultation

## 2019-12-14 NOTE — ED NOTES
Family states pt had a similar episode 10 years ago and was placed on Abilify with relief        Raymundo Hernandez RN  12/14/19 [id number]

## 2019-12-14 NOTE — ED PROVIDER NOTES
History  Chief Complaint   Patient presents with    Hallucinations     Pt presents to the ED with confusion and visual hallucinations since last night  78-year-old female sent in from local nursing home with chief complaint of hallucinations  Patient has had some mental confusion and visual hallucinations since last night  Patient is seen people who are not there and they are making violent threatening gestures and statements toward her  Patient contacted 911 last night secondary to the slough sedations  Family was contacted this morning and had the patient transferred here for evaluation  Patient had a recent similar episode and was evaluated and admitted through [facility]  As part of her evaluation a CT of the head was done which revealed no acute pathology  She had serial CBCs which did not reveal any significant pathology  She also had serial chemistries which again revealed mild hyperglycemia and a resolved mild FLORA  She also had serial troponins which were normal   Her urinalysis revealed small leukocytes but no other signs of infection  Her PT and PTT INR were within normal limits as well  History provided by:  Patient   used: No    Altered Mental Status   Presenting symptoms: confusion    Presenting symptoms comment:  Hallucinations    Severity:  Moderate  Most recent episode:  Yesterday  Episode history:  Single  Duration:  2 days  Progression:  Waxing and waning  Chronicity:  Recurrent  Context: dementia (Parkinson's), nursing home resident and recent change in medication    Associated symptoms: hallucinations    Associated symptoms: normal movement, no depression, no fever, no nausea, no palpitations, no rash, no seizures, no slurred speech and no vomiting        Prior to Admission Medications   Prescriptions Last Dose Informant Patient Reported? Taking?    ARIPiprazole (ABILIFY) 5 mg tablet   Yes No   Sig: Take 1 tablet by mouth daily   Alcohol Swabs (CVS ALCOHOL PREP SWABS) 70 % PADS  Self Yes No   Sig: by Does not apply route daily as needed   Cholecalciferol (VITAMIN D3) 1000 units CAPS   Yes No   Sig: Take 2 tablets by mouth daily At 9 pm    Dimethicone (REMEDY SKIN REPAIR) 1 5 % CREA  Self Yes No   Sig: Apply topically daily as needed   Ergocalciferol (VITAMIN D2 PO)  Outside Facility (Specify) Yes No   Sig: Take by mouth 50,000 take one cap by mouth every wednesday   Insulin Syringes, Disposable, (MONOJECT INS SYR 1CC) U-100 1 ML MISC  Self Yes No   Sig: 10 Units by Does not apply route 2 (two) times a day 7 am and 4 pm   Sennosides (SENNA-TABS PO)  Self Yes No   Sig: Take 100 mg by mouth 2 (two) times a day 9 am and 9 pm   acetaminophen (TYLENOL) 325 mg tablet  Self Yes No   Sig: Take 325 mg by mouth every 4 (four) hours as needed for mild pain   amLODIPine (NORVASC) 2 5 mg tablet  Other (Specify) Yes No   Sig: Take 2 5 mg by mouth daily   amLODIPine (NORVASC) 5 mg tablet   Yes No   Sig: Take 5 mg by mouth daily At bedtime    aspirin 81 MG tablet   Yes No   Sig: Take 81 mg by mouth daily at 9 am    carbidopa-levodopa (SINEMET)  mg per tablet   Yes No   Sig: Take 2 5 tablets by mouth 4 (four) times a day 7;30 am, 10;30, am, 1:30 pm, and 4:30 pm    cyanocobalamin (VITAMIN B-12) 500 mcg tablet   Yes No   Sig: Take 1 tablet by mouth daily At 9 pm    cycloSPORINE (RESTASIS) 0 05 % ophthalmic emulsion  Other (Specify) Yes No   Sig: Administer to both eyes 2 (two) times a day    furosemide (LASIX) 40 mg tablet   Yes No   Sig: Take 20 mg by mouth daily    guaiFENesin (ROBITUSSIN) 100 MG/5ML oral liquid   Yes No   Sig: Take 200 mg by mouth 4 (four) times a day as needed for cough   insulin aspart (NOVOLOG) 100 units/mL injection   Yes No   Sig: Novolog U-100 Insulin aspart 100 unit/mL subcutaneous solution   metoprolol succinate (TOPROL-XL) 100 mg 24 hr tablet   Yes No   Sig: Take 1 tablet by mouth daily at bedtime   nystatin (MYCOSTATIN) 100,000 units/mL suspension  Self Yes No   Sig: Apply 500,000 Units to the mouth or throat 4 (four) times a day   polyethylene glycol (MIRALAX) 17 g packet   Yes No   Sig: Take 17 g by mouth daily   simvastatin (ZOCOR) 20 mg tablet   Yes No   Sig: Take 20 mg by mouth daily At 9 pm    sitaGLIPtin (JANUVIA) 50 mg tablet   Yes No   Sig: Take 1 tablet by mouth 2 (two) times a day Once at 9 am and at 5 pm       Facility-Administered Medications: None       Past Medical History:   Diagnosis Date    Depression with anxiety     Diabetes ([facility] )     Edema     Essential hypertriglyceridemia     Hypercholesterolemia     Hypertension     Osteoarthritis of knee     Parkinson's disease ([facility] )        Past Surgical History:   Procedure Laterality Date     SECTION      ELBOW SURGERY         History reviewed  No pertinent family history  I have reviewed and agree with the history as documented  Social History     Tobacco Use    Smoking status: Never Smoker    Smokeless tobacco: Never Used   Substance Use Topics    Alcohol use: No    Drug use: No        Review of Systems   Constitutional: Negative for chills, diaphoresis and fever  Respiratory: Negative for shortness of breath  Cardiovascular: Negative for chest pain and palpitations  Gastrointestinal: Negative for diarrhea, nausea and vomiting  Genitourinary: Negative for dysuria and frequency  Skin: Negative for rash  Neurological: Negative for seizures  Psychiatric/Behavioral: Positive for confusion and hallucinations  All other systems reviewed and are negative  Physical Exam  Physical Exam   Constitutional: She is oriented to person, place, and time  She appears well-developed and well-nourished  She appears distressed  HENT:   Head: Normocephalic and atraumatic  Eyes: Pupils are equal, round, and reactive to light  EOM are normal    Neck: Normal range of motion  No JVD present     Cardiovascular: Normal rate, regular rhythm and intact distal pulses  Exam reveals no gallop and no friction rub  Murmur heard  Systolic murmur is present with a grade of 4/6  Pulmonary/Chest: Effort normal and breath sounds normal  No respiratory distress  She has no wheezes  She has no rales  She exhibits no tenderness  Musculoskeletal: Normal range of motion  She exhibits no tenderness  Neurological: She is alert and oriented to person, place, and time  Skin: Skin is warm and dry  There is pallor  Psychiatric: She has a normal mood and affect  Her behavior is normal  Judgment and thought content normal    Nursing note and vitals reviewed  Vital Signs  ED Triage Vitals   Temperature Pulse Respirations Blood Pressure SpO2   12/14/19 1113 12/14/19 1111 12/14/19 1111 12/14/19 1111 12/14/19 1111   97 9 °F (36 6 °C) 68 16 170/70 96 %      Temp Source Heart Rate Source Patient Position - Orthostatic VS BP Location FiO2 (%)   12/14/19 1113 12/14/19 1111 12/14/19 1200 -- --   Oral Monitor Lying        Pain Score       12/14/19 1111       No Pain           Vitals:    12/14/19 1111 12/14/19 1200 12/14/19 1330   BP: 170/70 130/60 (!) 172/75   Pulse: 68 64 64   Patient Position - Orthostatic VS:  Lying          Visual Acuity      ED Medications  Medications - No data to display    Diagnostic Studies  Results Reviewed     Procedure Component Value Units Date/Time    Urine Microscopic [[id number]]  (Abnormal) Collected:  12/14/19 1328    Lab Status:  Final result Specimen:  Urine, Clean Catch Updated:  12/14/19 1349     RBC, UA None Seen /hpf      WBC, UA 4-10 /hpf      Epithelial Cells Occasional /hpf      Bacteria, UA Occasional /hpf     Rapid drug screen, urine [[id number]] Collected:  12/14/19 1332    Lab Status:   In process Specimen:  Urine, Clean Catch Updated:  12/14/19 1336    Urine Macroscopic, POC [[id number]]  (Abnormal) Collected:  12/14/19 1328    Lab Status:  Final result Specimen:  Urine Updated:  12/14/19 1327     Color, UA Yellow     Clarity, UA Clear pH, UA 5 5     Leukocytes, UA Trace     Nitrite, UA Negative     Protein, UA Trace mg/dl      Glucose, UA Negative mg/dl      Ketones, UA Trace mg/dl      Urobilinogen, UA 0 2 E U /dl      Bilirubin, UA Negative     Blood, UA Negative     Specific Gravity, UA 1 020    Narrative:       CLINITEK RESULT    Ammonia [[id number]]  (Abnormal) Collected:  12/14/19 1132    Lab Status:  Final result Specimen:  Blood from Arm, Left Updated:  12/14/19 1231     Ammonia <10 umol/L     Ethanol [[id number]]  (Normal) Collected:  12/14/19 1133    Lab Status:  Final result Specimen:  Blood from Arm, Left Updated:  12/14/19 1211     Ethanol Lvl <3 mg/dL     Comprehensive metabolic panel [[id number]]  (Abnormal) Collected:  12/14/19 1133    Lab Status:  Final result Specimen:  Blood from Arm, Left Updated:  12/14/19 1204     Sodium 143 mmol/L      Potassium 5 0 mmol/L      Chloride 105 mmol/L      CO2 33 mmol/L      ANION GAP 5 mmol/L      BUN 20 mg/dL      Creatinine 1 22 mg/dL      Glucose 152 mg/dL      Calcium 8 8 mg/dL      AST 14 U/L      ALT 11 U/L      Alkaline Phosphatase 75 U/L      Total Protein 6 4 g/dL      Albumin 3 6 g/dL      Total Bilirubin 0 89 mg/dL      eGFR 40 ml/min/1 73sq m     Narrative:       Cristal guidelines for Chronic Kidney Disease (CKD):     Stage 1 with normal or high GFR (GFR > 90 mL/min/1 73 square meters)    Stage 2 Mild CKD (GFR = 60-89 mL/min/1 73 square meters)    Stage 3A Moderate CKD (GFR = 45-59 mL/min/1 73 square meters)    Stage 3B Moderate CKD (GFR = 30-44 mL/min/1 73 square meters)    Stage 4 Severe CKD (GFR = 15-29 mL/min/1 73 square meters)    Stage 5 End Stage CKD (GFR <15 mL/min/1 73 square meters)  Note: GFR calculation is accurate only with a steady state creatinine    Salicylate level [[id number]]  (Abnormal) Collected:  12/14/19 1133    Lab Status:  Final result Specimen:  Blood from Arm, Left Updated:  75/93/06 2292     Salicylate Lvl <3 mg/dL Acetaminophen level-If concentration is detectable, please discuss with medical  on call   [[id number]]  (Abnormal) Collected:  12/14/19 1133    Lab Status:  Final result Specimen:  Blood from Arm, Left Updated:  12/14/19 1204     Acetaminophen Level <2 ug/mL     Phosphorus [[id number]]  (Normal) Collected:  12/14/19 1133    Lab Status:  Final result Specimen:  Blood from Arm, Left Updated:  12/14/19 1203     Phosphorus 3 6 mg/dL     CBC and differential [[id number]]  (Abnormal) Collected:  12/14/19 1132    Lab Status:  Final result Specimen:  Blood from Arm, Left Updated:  12/14/19 1146     WBC 7 25 Thousand/uL      RBC 4 80 Million/uL      Hemoglobin 14 1 g/dL      Hematocrit 45 1 %      MCV 94 fL      MCH 29 4 pg      MCHC 31 3 g/dL      RDW 13 6 %      MPV 12 7 fL      Platelets 004 Thousands/uL      nRBC 0 /100 WBCs      Neutrophils Relative 65 %      Immat GRANS % 1 %      Lymphocytes Relative 24 %      Monocytes Relative 8 %      Eosinophils Relative 2 %      Basophils Relative 0 %      Neutrophils Absolute 4 72 Thousands/µL      Immature Grans Absolute 0 06 Thousand/uL      Lymphocytes Absolute 1 71 Thousands/µL      Monocytes Absolute 0 61 Thousand/µL      Eosinophils Absolute 0 12 Thousand/µL      Basophils Absolute 0 03 Thousands/µL                  No orders to display              Procedures  Procedures         ED Course                               MDM  Number of Diagnoses or Management Options  Confusion: new and requires workup  Hallucinations: new and requires workup  Diagnosis management comments: Background: 80 y o  female presents with visual hallucinations and confusion    Differential DX includes but is not limited to: worsening parkinson's, medication side effect, metabolic derangement, urinary tract infection, doubt CVA, possible psychiatric disease    Plan: cbc, metabolic panel, urinalysis, possible/probable admission          Amount and/or Complexity of Data Reviewed  Clinical lab tests: ordered and reviewed    Risk of Complications, Morbidity, and/or Mortality  Presenting problems: high  Diagnostic procedures: high  Management options: high    Patient Progress  Patient progress: stable        Disposition  Final diagnoses:   Hallucinations   Confusion     Time reflects when diagnosis was documented in both MDM as applicable and the Disposition within this note     Time User Action Codes Description Comment    12/14/2019  1:53 PM Marcha Gitelman B Add [R44 3] Hallucinations     12/14/2019  1:53 PM Alfonso Brown Add [R41 0] Confusion       ED Disposition     ED Disposition Condition Date/Time Comment    Admit Stable Sat Dec 14, 2019  1:53 PM Case was discussed with Dr Abel Velazquez and the patient's admission status was agreed to be Admission Status: inpatient status to the service of Dr bAel Velazquez   Follow-up Information    None         Patient's Medications   Discharge Prescriptions    No medications on file     No discharge procedures on file      ED Provider  Electronically Signed by           Agnes Godoy MD  12/14/19 [id number] Balaji Meraz MD  12/14/19 3169

## 2019-12-14 NOTE — H&P
H&P- Zara Vail 1933, 80 y o  female MRN: [id number]    Unit/Bed#: S -01 Encounter: [id number]    Primary Care Provider: Emi Cain MD   Date and time admitted to hospital: 12/14/2019 11:05 AM    * Visual hallucinations  Assessment & Plan  · Patient reports visual hallucinations starting the evening of 12/9/2019  Patient describes hallucinations as either small children were grown adult men  She states that the children do not bother her or threaten her, but the adult men do make threatening gestures toward her  Neither hallucinations talk to her, and when she speak to them they do not respond  Previously, had hallucinations approximately 10 years ago was placed on Abilify with improvement of hallucinations  It is entirely unclear if hallucinations are truly only new over the last 5 days versus have been ongoing for some period of time and patient has not been alerting staff at assisted living facility  · Suspect 2/2 known Parkinson's dz  · She has already been evaluated at [facility] and had undergone infectious workup that was overall negative  CT scan of the head was within normal limits  · Additionally will check TSH and B12  · Delirium precautions  · Continue abilify 10mg PO QHS  · Appreciate neurology evaluation-- appreciate input; consider behavioral health consultation    Holosystolic murmur  Assessment & Plan  · Best heard at R upper sternal border  · Suspect AS  · No sx  · Unlikely cause of hallucination  · Echo as an OP     Type 2 diabetes mellitus, with long-term current use of insulin (HCC)  Assessment & Plan  No results found for: HGBA1C    No results for input(s): POCGLU in the last 72 hours      Blood Sugar Average: Last 72 hrs:  · last A1c 7 3%   · Hold PO   · SSI for correction  · QID accuchecks       Neurologic gait dysfunction  Assessment & Plan  · Uses walker, wheelchair    Essential hypertension  Assessment & Plan  · Continue home BP medications    Parkinson's disease ([facility] [address] )  Assessment & Plan  · Continue sinemet     VTE Prophylaxis: Enoxaparin (Lovenox)  / reason for no mechanical VTE prophylaxis Moderate risk   Code Status:  Level 3  POLST: There is no POLST form on file for this patient (pre-hospital)  Discussion with family:  Daughter aware of need for admission    Anticipated Length of Stay:  Patient will be admitted on an Inpatient basis with an anticipated length of stay of  greater than 2 midnights  Justification for Hospital Stay:  Eval hallucinations    Total Time for Visit, including Counseling / Coordination of Care: 30 minutes  Greater than 50% of this total time spent on direct patient counseling and coordination of care  Chief Complaint:   hallucinations    History of Present Illness:    Davion Trinh is a 80 y o  female with past medical history significant for Parkinson's disease presents to the ED for evaluation of hallucinations and confusion  Patient reports on 12/9/2019 he had visual hallucinations and she went to see [facility] for evaluation  No infectious cause was identified, and they opted to increase her Abilify from 5 mg to 10 mg  She was placed on Abilify 5 mg approximately 10 years ago when she 1st started having hallucinations  Patient again had hallucinations on the night of 12/13/2019, and they were distressing to a point where she called 911  Family is concerned about the increasing frequency of hallucinations  Patient herself is otherwise alert and oriented x4  She is fully aware she is hallucinating  She describes the hallucinations as people, describing in 2 different hallucinations including young children and adults grown men  She reports of hallucinations do not speak to her, and when she speaks and they do not reply  She states that showed often do not bother her, but on occasion the med will make threatening gestures toward her      Review of Systems:    Review of Systems Constitutional: Negative  HENT: Negative  Eyes: Negative  Respiratory: Negative  Cardiovascular: Negative  Gastrointestinal: Negative  Genitourinary: Negative  Musculoskeletal: Negative  Skin: Negative  Neurological: Negative  Psychiatric/Behavioral: Positive for confusion and hallucinations  Past Medical and Surgical History:     Past Medical History:   Diagnosis Date    Depression with anxiety     Diabetes ([facility] )     Edema     Essential hypertriglyceridemia     Hypercholesterolemia     Hypertension     Osteoarthritis of knee     Parkinson's disease ([facility] )        Past Surgical History:   Procedure Laterality Date     SECTION      ELBOW SURGERY         Meds/Allergies:    Prior to Admission medications    Medication Sig Start Date End Date Taking?  Authorizing Provider   acetaminophen (TYLENOL) 325 mg tablet Take 325 mg by mouth every 4 (four) hours as needed for mild pain   Yes Historical Provider, MD   amLODIPine (NORVASC) 2 5 mg tablet Take 2 5 mg by mouth daily   Yes Historical Provider, MD   amLODIPine (NORVASC) 5 mg tablet Take 5 mg by mouth daily At bedtime  14  Yes Historical Provider, MD   ARIPiprazole (ABILIFY) 5 mg tablet Take 10 mg by mouth daily    Yes Historical Provider, MD   aspirin 81 MG tablet Take 81 mg by mouth daily at 9 am  10/12/09  Yes Historical Provider, MD   carbidopa-levodopa (SINEMET)  mg per tablet Take 2 5 tablets by mouth 4 (four) times a day 7;30 am, 10;30, am, 1:30 pm, and 4:30 pm  13  Yes Historical Provider, MD   Cholecalciferol (VITAMIN D3) 1000 units CAPS Take 2 tablets by mouth daily At 9 pm    Yes Historical Provider, MD   cyanocobalamin (VITAMIN B-12) 500 mcg tablet Take 1 tablet by mouth daily At 9 pm    Yes Historical Provider, MD   cycloSPORINE (RESTASIS) 0 05 % ophthalmic emulsion Administer to both eyes 2 (two) times a day    Yes Historical Provider, MD   Dimethicone (REMEDY SKIN REPAIR) 1 5 % CREA Apply topically 2 (two) times a day    Yes Historical Provider, MD   Ergocalciferol (VITAMIN D2 PO) Take by mouth 50,000 take one cap by mouth every wednesday   Yes Historical Provider, MD   furosemide (LASIX) 40 mg tablet Take 20 mg by mouth daily  3/3/11  Yes Historical Provider, MD   insulin aspart (NOVOLOG) 100 units/mL injection Novolog U-100 Insulin aspart 100 unit/mL subcutaneous solution   Yes Historical Provider, MD   Insulin Syringes, Disposable, (MONOJECT INS SYR 1CC) U-100 1 ML MISC by Does not apply route 2 (two) times a day 7 am and 4 pm    Yes Historical Provider, MD   metoprolol succinate (TOPROL-XL) 100 mg 24 hr tablet Take 1 tablet by mouth daily at bedtime   Yes Historical Provider, MD   nystatin (MYCOSTATIN) 100,000 units/mL suspension Apply 500,000 Units to the mouth or throat 4 (four) times a day   Yes Historical Provider, MD   polyethylene glycol (MIRALAX) 17 g packet Take 17 g by mouth daily   Yes Historical Provider, MD   Sennosides (SENNA-TABS PO) Take 100 mg by mouth 2 (two) times a day 9 am and 9 pm   Yes Historical Provider, MD   simvastatin (ZOCOR) 20 mg tablet Take 20 mg by mouth daily At 9 pm  3/3/11  Yes Historical Provider, MD   Alcohol Swabs ([employer] ALCOHOL PREP SWABS) 70 % PADS by Does not apply route daily as needed    Historical Provider, MD   Carboxymethylcellulose Sodium (EYE DROPS OP) Apply to eye    Historical Provider, MD   guaiFENesin (ROBITUSSIN) 100 MG/5ML oral liquid Take 200 mg by mouth 4 (four) times a day as needed for cough    Historical Provider, MD   sitaGLIPtin (JANUVIA) 50 mg tablet Take 1 tablet by mouth 2 (two) times a day Once at 9 am and at 5 pm     Historical Provider, MD     I have reviewed home medications with patient personally  Allergies:    Allergies   Allergen Reactions    Metformin     Penicillins Other (See Comments)     Tolerates Cfaz    Sulfa Antibiotics     Torsemide Rash       Social History:     Marital Status: Single   Occupation: Retired  Patient Pre-hospital Living Situation:  Assisted living facility  Patient Pre-hospital Level of Mobility:  Wheelchair and walker  Patient Pre-hospital Diet Restrictions:  Soft  Substance Use History:   Social History     Substance and Sexual Activity   Alcohol Use No     Social History     Tobacco Use   Smoking Status Never Smoker   Smokeless Tobacco Never Used     Social History     Substance and Sexual Activity   Drug Use No       Family History:    History reviewed  No pertinent family history  Physical Exam:     Vitals:   Blood Pressure: 154/85 (12/14/19 1547)  Pulse: 72 (12/14/19 1547)  Temperature: 98 1 °F (36 7 °C) (12/14/19 1547)  Temp Source: Oral (12/14/19 1547)  Respirations: 16 (12/14/19 1547)  Weight - Scale: 72 4 kg (159 lb 9 8 oz) (12/14/19 1111)  SpO2: 97 % (12/14/19 1547)    Physical Exam   Constitutional: She is oriented to person, place, and time  No distress  HENT:   Head: Normocephalic and atraumatic  Dentition in poor repair   Eyes: Pupils are equal, round, and reactive to light  EOM are normal    Neck: No JVD present  Cardiovascular: Exam reveals no gallop and no friction rub  Murmur ( holosystolic murmur) heard  Pulmonary/Chest: Effort normal and breath sounds normal  No stridor  No respiratory distress  She has no wheezes  Abdominal: Soft  Bowel sounds are normal  She exhibits no distension  There is no tenderness  There is no guarding  Musculoskeletal: She exhibits edema (trace b/l LE)  Neurological: She is alert and oriented to person, place, and time  She exhibits abnormal muscle tone (Generalized weakness)  Skin: Skin is warm and dry  She is not diaphoretic  Chronic venous stasis dermatitis bilateral lower extremities   Psychiatric:   Actively denies auditory or visual hallucinations at this time       Additional Data:     Lab Results: I have personally reviewed pertinent reports        Results from last 7 days   Lab Units 12/14/19  1132   WBC Thousand/uL 7  25   HEMOGLOBIN g/dL 14 1   HEMATOCRIT % 45 1   PLATELETS Thousands/uL 160   NEUTROS PCT % 65   LYMPHS PCT % 24   MONOS PCT % 8   EOS PCT % 2     Results from last 7 days   Lab Units 12/14/19  1133   SODIUM mmol/L 143   POTASSIUM mmol/L 5 0   CHLORIDE mmol/L 105   CO2 mmol/L 33*   BUN mg/dL 20   CREATININE mg/dL 1 22   ANION GAP mmol/L 5   CALCIUM mg/dL 8 8   ALBUMIN g/dL 3 6   TOTAL BILIRUBIN mg/dL 0 89   ALK PHOS U/L 75   ALT U/L 11*   AST U/L 14   GLUCOSE RANDOM mg/dL 152*         Results from last 7 days   Lab Units 12/14/19  1625   POC GLUCOSE mg/dl 152*               Imaging: I have personally reviewed pertinent reports  No orders to display       EKG, Pathology, and Other Studies Reviewed on Admission:   · EKG: unavailable     Allscripts / Epic Records Reviewed: Yes     ** Please Note: This note has been constructed using a voice recognition system   **

## 2019-12-14 NOTE — TELEPHONE ENCOUNTER
Spoke to pt's daughter Bharathi Saleh this am   Pt was in [facility] hospital this past week for hallucinations and diff with balance  pt's abilify was increased on discharge  Pt was back to assisted living facility  Pt had issues last night, seeing people, felt not safe and pt called 911 herselft from her home last boy  Family seeing pt this am and pt is very confused, not sure where she is and change in mental status  No prior mention of memory or confusion per prior office notes  Pt rec to go to er for further evaluation as pt is not at baseline with change in mental status  Family do not feel comfortable with her present state

## 2019-12-14 NOTE — ASSESSMENT & PLAN NOTE
No results found for: HGBA1C    No results for input(s): POCGLU in the last 72 hours      Blood Sugar Average: Last 72 hrs:  · last A1c 7 3%   · Hold PO   · SSI for correction  · QID accuchecks

## 2019-12-15 NOTE — ASSESSMENT & PLAN NOTE
No results found for: HGBA1C    Recent Labs     12/14/19  1625 12/14/19  2055 12/15/19  0720 12/15/19  1135   POCGLU 152* 197* 157* 164*     · Hold oral antihyperglycemics  · SSI for correction  No basal / bolus needed as glucose levels are OK  · Monitor blood sugars

## 2019-12-15 NOTE — ASSESSMENT & PLAN NOTE
· Background - Patient reports visual hallucinations starting the evening of 12/9/2019  Patient describes hallucinations as either small children were grown adult men  She states that the children do not bother her or threaten her, but the adult men do make threatening gestures toward her  Neither hallucinations talk to her, and when she speak to them they do not respond  Previously, had hallucinations approximately 10 years ago was placed on Abilify with improvement of hallucinations  It is entirely unclear if hallucinations are truly only new over the last 5 days versus have been ongoing for some period of time and patient has not been alerting staff at assisted living facility  [facility] and had undergone infectious workup that was overall negative  CT scan of the head was within normal limits  · Evaluation for neurological cause initially -   · Sinemet dosing adjustment being made by neurology as this is potential cause  · Sinemet dosing adjustment - 2 5 tabs at 7:30, 2 tabs at 10:30 am, 2 5 tabs at 1:30 pm, and 2 tabs at 4:30 pm   · Starting on Rivastigmine 3 mg bid  · Might consider pimvanserin in outpatient setting during follow up  · Seen by neurology this admission  · Evaluate for psychiatric causes -   · Consider psychiatry evaluation if not responding to treatment for suspected neurological causes  · No clear infectious or metabolic etiology at present time

## 2019-12-15 NOTE — PLAN OF CARE
Problem: Potential for Falls  Goal: Patient will remain free of falls  Description  INTERVENTIONS:  - Assess patient frequently for physical needs  -  Identify cognitive and physical deficits and behaviors that affect risk of falls    -  [place] fall precautions as indicated by assessment   - Educate patient/family on patient safety including physical limitations  - Instruct patient to call for assistance with activity based on assessment  - Modify environment to reduce risk of injury  - Consider OT/PT consult to assist with strengthening/mobility  Outcome: Progressing     Problem: Prexisting or High Potential for Compromised Skin Integrity  Goal: Skin integrity is maintained or improved  Description  INTERVENTIONS:  - Identify patients at risk for skin breakdown  - Assess and monitor skin integrity  - Assess and monitor nutrition and hydration status  - Monitor labs   - Assess for incontinence   - Turn and reposition patient  - Assist with mobility/ambulation  - Relieve pressure over bony prominences  - Avoid friction and shearing  - Provide appropriate hygiene as needed including keeping skin clean and dry  - Evaluate need for skin moisturizer/barrier cream  - Collaborate with interdisciplinary team   - Patient/family teaching  - Consider wound care consult   Outcome: Progressing     Problem: PAIN - ADULT  Goal: Verbalizes/displays adequate comfort level or baseline comfort level  Description  Interventions:  - Encourage patient to monitor pain and request assistance  - Assess pain using appropriate pain scale  - Administer analgesics based on type and severity of pain and evaluate response  - Implement non-pharmacological measures as appropriate and evaluate response  - Consider cultural and social influences on pain and pain management  - Notify physician/advanced practitioner if interventions unsuccessful or patient reports new pain  Outcome: Progressing     Problem: INFECTION - ADULT  Goal: Absence or prevention of progression during hospitalization  Description  INTERVENTIONS:  - Assess and monitor for signs and symptoms of infection  - Monitor lab/diagnostic results  - Monitor all insertion sites, i e  indwelling lines, tubes, and drains  - Monitor endotracheal if appropriate and nasal secretions for changes in amount and color  - [place] appropriate cooling/warming therapies per order  - Administer medications as ordered  - Instruct and encourage patient and family to use good hand hygiene technique  - Identify and instruct in appropriate isolation precautions for identified infection/condition  Outcome: Progressing  Goal: Absence of fever/infection during neutropenic period  Description  INTERVENTIONS:  - Monitor WBC    Outcome: Progressing     Problem: SAFETY ADULT  Goal: Patient will remain free of falls  Description  INTERVENTIONS:  - Assess patient frequently for physical needs  -  Identify cognitive and physical deficits and behaviors that affect risk of falls    -  [place] fall precautions as indicated by assessment   - Educate patient/family on patient safety including physical limitations  - Instruct patient to call for assistance with activity based on assessment  - Modify environment to reduce risk of injury  - Consider OT/PT consult to assist with strengthening/mobility  Outcome: Progressing  Goal: Maintain or return to baseline ADL function  Description  INTERVENTIONS:  -  Assess patient's ability to carry out ADLs; assess patient's baseline for ADL function and identify physical deficits which impact ability to perform ADLs (bathing, care of mouth/teeth, toileting, grooming, dressing, etc )  - Assess/evaluate cause of self-care deficits   - Assess range of motion  - Assess patient's mobility; develop plan if impaired  - Assess patient's need for assistive devices and provide as appropriate  - Encourage maximum independence but intervene and supervise when necessary  - Involve family in performance of ADLs  - Assess for home care needs following discharge   - Consider OT consult to assist with ADL evaluation and planning for discharge  - Provide patient education as appropriate  Outcome: Progressing  Goal: Maintain or return mobility status to optimal level  Description  INTERVENTIONS:  - Assess patient's baseline mobility status (ambulation, transfers, stairs, etc )    - Identify cognitive and physical deficits and behaviors that affect mobility  - Identify mobility aids required to assist with transfers and/or ambulation (gait belt, sit-to-stand, lift, walker, cane, etc )  - [place] fall precautions as indicated by assessment  - Record patient progress and toleration of activity level on Mobility SBAR; progress patient to next Phase/Stage  - Instruct patient to call for assistance with activity based on assessment  - Consider rehabilitation consult to assist with strengthening/weightbearing, etc   Outcome: Progressing     Problem: DISCHARGE PLANNING  Goal: Discharge to home or other facility with appropriate resources  Description  INTERVENTIONS:  - Identify barriers to discharge w/patient and caregiver  - Arrange for needed discharge resources and transportation as appropriate  - Identify discharge learning needs (meds, wound care, etc )  - Arrange for interpretive services to assist at discharge as needed  - Refer to Case Management Department for coordinating discharge planning if the patient needs post-hospital services based on physician/advanced practitioner order or complex needs related to functional status, cognitive ability, or social support system  Outcome: Progressing     Problem: Knowledge Deficit  Goal: Patient/family/caregiver demonstrates understanding of disease process, treatment plan, medications, and discharge instructions  Description  Complete learning assessment and assess knowledge base    Interventions:  - Provide teaching at level of understanding  - Provide teaching via preferred learning methods  Outcome: Progressing

## 2019-12-15 NOTE — PLAN OF CARE
Problem: Potential for Falls  Goal: Patient will remain free of falls  Description  INTERVENTIONS:  - Assess patient frequently for physical needs  -  Identify cognitive and physical deficits and behaviors that affect risk of falls    -  [place] fall precautions as indicated by assessment   - Educate patient/family on patient safety including physical limitations  - Instruct patient to call for assistance with activity based on assessment  - Modify environment to reduce risk of injury  - Consider OT/PT consult to assist with strengthening/mobility  Outcome: Progressing     Problem: Prexisting or High Potential for Compromised Skin Integrity  Goal: Skin integrity is maintained or improved  Description  INTERVENTIONS:  - Identify patients at risk for skin breakdown  - Assess and monitor skin integrity  - Assess and monitor nutrition and hydration status  - Monitor labs   - Assess for incontinence   - Turn and reposition patient  - Assist with mobility/ambulation  - Relieve pressure over bony prominences  - Avoid friction and shearing  - Provide appropriate hygiene as needed including keeping skin clean and dry  - Evaluate need for skin moisturizer/barrier cream  - Collaborate with interdisciplinary team   - Patient/family teaching  - Consider wound care consult   Outcome: Progressing     Problem: PAIN - ADULT  Goal: Verbalizes/displays adequate comfort level or baseline comfort level  Description  Interventions:  - Encourage patient to monitor pain and request assistance  - Assess pain using appropriate pain scale  - Administer analgesics based on type and severity of pain and evaluate response  - Implement non-pharmacological measures as appropriate and evaluate response  - Consider cultural and social influences on pain and pain management  - Notify physician/advanced practitioner if interventions unsuccessful or patient reports new pain  Outcome: Progressing     Problem: INFECTION - ADULT  Goal: Absence or prevention of progression during hospitalization  Description  INTERVENTIONS:  - Assess and monitor for signs and symptoms of infection  - Monitor lab/diagnostic results  - Monitor all insertion sites, i e  indwelling lines, tubes, and drains  - Monitor endotracheal if appropriate and nasal secretions for changes in amount and color  - [place] appropriate cooling/warming therapies per order  - Administer medications as ordered  - Instruct and encourage patient and family to use good hand hygiene technique  - Identify and instruct in appropriate isolation precautions for identified infection/condition  Outcome: Progressing  Goal: Absence of fever/infection during neutropenic period  Description  INTERVENTIONS:  - Monitor WBC    Outcome: Progressing     Problem: SAFETY ADULT  Goal: Patient will remain free of falls  Description  INTERVENTIONS:  - Assess patient frequently for physical needs  -  Identify cognitive and physical deficits and behaviors that affect risk of falls    -  [place] fall precautions as indicated by assessment   - Educate patient/family on patient safety including physical limitations  - Instruct patient to call for assistance with activity based on assessment  - Modify environment to reduce risk of injury  - Consider OT/PT consult to assist with strengthening/mobility  Outcome: Progressing  Goal: Maintain or return to baseline ADL function  Description  INTERVENTIONS:  -  Assess patient's ability to carry out ADLs; assess patient's baseline for ADL function and identify physical deficits which impact ability to perform ADLs (bathing, care of mouth/teeth, toileting, grooming, dressing, etc )  - Assess/evaluate cause of self-care deficits   - Assess range of motion  - Assess patient's mobility; develop plan if impaired  - Assess patient's need for assistive devices and provide as appropriate  - Encourage maximum independence but intervene and supervise when necessary  - Involve family in performance of ADLs  - Assess for home care needs following discharge   - Consider OT consult to assist with ADL evaluation and planning for discharge  - Provide patient education as appropriate  Outcome: Progressing  Goal: Maintain or return mobility status to optimal level  Description  INTERVENTIONS:  - Assess patient's baseline mobility status (ambulation, transfers, stairs, etc )    - Identify cognitive and physical deficits and behaviors that affect mobility  - Identify mobility aids required to assist with transfers and/or ambulation (gait belt, sit-to-stand, lift, walker, cane, etc )  - [place] fall precautions as indicated by assessment  - Record patient progress and toleration of activity level on Mobility SBAR; progress patient to next Phase/Stage  - Instruct patient to call for assistance with activity based on assessment  - Consider rehabilitation consult to assist with strengthening/weightbearing, etc   Outcome: Progressing     Problem: DISCHARGE PLANNING  Goal: Discharge to home or other facility with appropriate resources  Description  INTERVENTIONS:  - Identify barriers to discharge w/patient and caregiver  - Arrange for needed discharge resources and transportation as appropriate  - Identify discharge learning needs (meds, wound care, etc )  - Arrange for interpretive services to assist at discharge as needed  - Refer to Case Management Department for coordinating discharge planning if the patient needs post-hospital services based on physician/advanced practitioner order or complex needs related to functional status, cognitive ability, or social support system  Outcome: Progressing     Problem: Knowledge Deficit  Goal: Patient/family/caregiver demonstrates understanding of disease process, treatment plan, medications, and discharge instructions  Description  Complete learning assessment and assess knowledge base    Interventions:  - Provide teaching at level of understanding  - Provide teaching via preferred learning methods  Outcome: Progressing

## 2019-12-15 NOTE — CONSULTS
Consultation - Neurology   Josefa Dai 80 y o  female MRN: [id number]  Unit/Bed#: S -01 Encounter: [id number]      Assessment/Plan   Assessment:  80year old woman with PD, HTN, aortic stenosis, presenting with worsening hallucinations  Hallucinations are long standing, but of late they have become more threatening  Her B12 was normal, and TSH was mildly elevated  No clear history or physical signs of intercurrent illness at this time  The worsening hallucinations do not correspond temporally to any increase in her medication, including a recent sinemet increase in May of this year  Hallucinations in Parkinson's disease are a well described phenomenon  Treatment options typically include reduction in dopaminergic medications, as tolerated  There may be a very narrow therapeutic profile  Agents to improve cognition and reduce release phenomenon include the anticholinesterases  New agent pimvanserin is also now available to reduce psychotic symptoms in Parkinson's disease, but this likely requires insurance preauthorization       -would trial a modest decrease in her sinemet regiment  For the last 6 months, she has been at 25/100mg sinemet, 2 5 tablets, 4 times daily  Could stagger the medication so that every other dose is 2 tablets instead of 2 5  Thus 2 5 tablets at 7:30, 2 tables at 10:30, 2 5 tablets at 1:30pm, and 2 tablets at 4:30pm     -additionally, would start her on twice daily rivastigmine 3mg   -might consider pimvanserin when she returns for movement disorder follow up  -would ensure she has regular bowel movements with bulk fiber, stool softener, or a laxative     -has restless leg syndrome, likely related to her parkinson's, but would not increase her dopamine agonism at this point, given the worsening hallucinations     -has occasional lightheadedness, but would not treat with oral hypertensives    Could consider compression stockings, and encourage PO intake     -no clear metabolic or infectious processes at play at this point to attribute worsening hallucinations    History of Present Illness     Reason for Consult / Principal Problem: visual hallucinations  Hx and PE limited by: none  HPI: Stefany Baker is a 80 y o  right handed female with long standing history of Parkinson's Disease, HTN, aoritc stenosis, who presents from nursing home with worsening hallucinations  She is followed [facility] the [facility] by Dr Sylvie Caro for most recently  When asked about the content of her visual hallucinations the patient stated Chandler Hansen were coming to get me   She endorses a long history of hallucinations for about the last 10 years  The content of the hallucinations is often nonthreatening but more recently she has been more disconcerted by them  Typically the current night while she is sleeping but she says they are also present during the day albeit with less frequency  The the other evening she had a hallucination while in the nursing home and called 911  At baseline the patient lives in a nursing home and requires constant supervision  [place] time is spent either in bed or in a wheelchair  She is able to stand for brief period of time to assist in her transfers from the chair to the bed or to a toilet  She states that when she takes her Sinemet about half an hour later her legs feel less tight  She does feel the Sinemet wearing off after about 3 hours  Hallucinations began at about 2010  She started Abilify several years ago which helped with decreasing the frequency of the hallucinations  More recently she was seen in the ED at [facility] again for disturbing hallucinations and her Abilify dose was increased from 5-10 mg daily  Her Sinemet was increased in May of this year from 2 tablets of 25/100 Sinemet 4 times daily to 2 5 tablets 4 times daily        She denies any medical complaints including cough, dysuria, chest pain, headache, abdominal pain, fevers, chills, and weight loss  At baseline she has a poor appetite but she tries to keep up with p o  Intake  She has a bowel movement about once every 2-3 days  Consults    Review of Systems    Historical Information   Past Medical History:   Diagnosis Date    Depression with anxiety     Diabetes [facility] [address] [facility]     Edema     Essential hypertriglyceridemia     Hypercholesterolemia     Hypertension     Osteoarthritis of knee     Parkinson's disease ([facility] )      Past Surgical History:   Procedure Laterality Date     SECTION      ELBOW SURGERY       Social History   Social History     Substance and Sexual Activity   Alcohol Use No     Social History     Substance and Sexual Activity   Drug Use No     Social History     Tobacco Use   Smoking Status Never Smoker   Smokeless Tobacco Never Used     Family History: History reviewed  No pertinent family history  Meds/Allergies   PTA meds:   Prior to Admission Medications   Prescriptions Last Dose Informant Patient Reported? Taking?    ARIPiprazole (ABILIFY) 5 mg tablet 2019 at Unknown time  Yes Yes   Sig: Take 10 mg by mouth daily    Alcohol Swabs ([employer] ALCOHOL PREP SWABS) 70 % PADS  Self Yes No   Sig: by Does not apply route daily as needed   Carboxymethylcellulose Sodium (EYE DROPS OP)   Yes No   Sig: Apply to eye   Cholecalciferol (VITAMIN D3) 1000 units CAPS   Yes Yes   Sig: Take 2 tablets by mouth daily At 9 pm    Dimethicone (REMEDY SKIN REPAIR) 1 5 % CREA  Self Yes Yes   Sig: Apply topically 2 (two) times a day    Ergocalciferol (VITAMIN D2 PO)  Outside Facility (Specify) Yes Yes   Sig: Take by mouth 50,000 take one cap by mouth every wednesday   Insulin Syringes, Disposable, (MONOJECT INS SYR 1CC) U-100 1 ML MISC  Self Yes Yes   Sig: by Does not apply route 2 (two) times a day 7 am and 4 pm    Sennosides (SENNA-TABS PO) 2019 at Unknown time Self Yes Yes   Sig: Take 100 mg by mouth 2 (two) times a day 9 am and 9 pm   acetaminophen (TYLENOL) 325 mg tablet  Self Yes Yes   Sig: Take 325 mg by mouth every 4 (four) hours as needed for mild pain   amLODIPine (NORVASC) 2 5 mg tablet 12/14/2019 at Unknown time Other (Specify) Yes Yes   Sig: Take 2 5 mg by mouth daily   amLODIPine (NORVASC) 5 mg tablet 12/14/2019 at Unknown time  Yes Yes   Sig: Take 5 mg by mouth daily At bedtime    aspirin 81 MG tablet 12/14/2019 at Unknown time  Yes Yes   Sig: Take 81 mg by mouth daily at 9 am    carbidopa-levodopa (SINEMET)  mg per tablet   Yes Yes   Sig: Take 2 5 tablets by mouth 4 (four) times a day 7;30 am, 10;30, am, 1:30 pm, and 4:30 pm    cyanocobalamin (VITAMIN B-12) 500 mcg tablet   Yes Yes   Sig: Take 1 tablet by mouth daily At 9 pm    cycloSPORINE (RESTASIS) 0 05 % ophthalmic emulsion  Other (Specify) Yes Yes   Sig: Administer to both eyes 2 (two) times a day    furosemide (LASIX) 40 mg tablet 12/14/2019 at Unknown time  Yes Yes   Sig: Take 20 mg by mouth daily    guaiFENesin (ROBITUSSIN) 100 MG/5ML oral liquid   Yes No   Sig: Take 200 mg by mouth 4 (four) times a day as needed for cough   insulin aspart (NOVOLOG) 100 units/mL injection   Yes Yes   Sig: Novolog U-100 Insulin aspart 100 unit/mL subcutaneous solution   metoprolol succinate (TOPROL-XL) 100 mg 24 hr tablet 12/13/2019 at Unknown time  Yes Yes   Sig: Take 1 tablet by mouth daily at bedtime   nystatin (MYCOSTATIN) 100,000 units/mL suspension  Self Yes Yes   Sig: Apply 500,000 Units to the mouth or throat 4 (four) times a day   polyethylene glycol (MIRALAX) 17 g packet   Yes Yes   Sig: Take 17 g by mouth daily   simvastatin (ZOCOR) 20 mg tablet 12/13/2019 at Unknown time  Yes Yes   Sig: Take 20 mg by mouth daily At 9 pm    sitaGLIPtin (JANUVIA) 50 mg tablet   Yes No   Sig: Take 1 tablet by mouth 2 (two) times a day Once at 9 am and at 5 pm       Facility-Administered Medications: None       Allergies   Allergen Reactions    Metformin     Penicillins Other (See Comments)     Tolerates Cfaz  Sulfa Antibiotics     Torsemide Rash       Objective   Vitals:Blood pressure 164/79, pulse 80, temperature 98 7 °F (37 1 °C), temperature source Oral, resp  rate 16, weight 72 4 kg (159 lb 9 8 oz), SpO2 97 %  ,Body mass index is 31 17 kg/m²  Intake/Output Summary (Last 24 hours) at 12/15/2019 0834  Last data filed at 12/14/2019 2300  Gross per 24 hour   Intake 0 ml   Output 180 ml   Net -180 ml       Invasive Devices: Invasive Devices     Peripheral Intravenous Line            Peripheral IV 12/14/19 Left Antecubital less than 1 day                Physical Exam   In bed, NAD  CV: holosystolic grade 5/6 machine-like murmur, S1, S2, RRR  PULM: CTAB  GI: soft, non-tender  SKIN: multiple ecchymoses on dorsum of palms, no rashes  MUSK: no stigmata of embolic phenomenon  EXTREM: no CCE    Neurologic Exam  Awake, alert, oriented to hospital and date  Gave the president's name  The history she gives is rather comprehensive  Normal naming, repetition, there is a slight latency in speech, and fluency is somewhat diminished  Pupils 3-2mm, symmetric, no papilledema, full  EOM, saccadic intrusions, but no nystagmus, hypomimia and subtle hypophonia, no facial weakness, tongue is midline, absent jaw jerk  MOTOR: normal bulk, there is mild cogwheeling rigidity in bilateral upper extremities, the lower extremities with bilateral spastic quadriceps, also with pain on palpation  SENSORY: intact symmetrically to light touch  No neglect  GAIT: not tested     COORDINATION: normal FNF      Lab Results:   CBC:   Results from last 7 days   Lab Units 12/15/19  0530 12/14/19  1132   WBC Thousand/uL 8 83 7 25   RBC Million/uL 4 51 4 80   HEMOGLOBIN g/dL 13 3 14 1   HEMATOCRIT % 41 6 45 1   MCV fL 92 94   PLATELETS Thousands/uL 157 160   , BMP/CMP:   Results from last 7 days   Lab Units 12/15/19  0530 12/14/19  1133   SODIUM mmol/L 141 143   POTASSIUM mmol/L 3 7 5 0   CHLORIDE mmol/L 105 105   CO2 mmol/L 27 33*   BUN mg/dL 17 20   CREATININE mg/dL 1 12 1 22   CALCIUM mg/dL 8 8 8 8   AST U/L  --  14   ALT U/L  --  11*   ALK PHOS U/L  --  75   EGFR ml/min/1 73sq m 45 40

## 2019-12-15 NOTE — PROGRESS NOTES
Progress Note - Alcon Schaefer 1933, 80 y o  female MRN: [id number]    Unit/Bed#: S -01 Encounter: [id number]    Primary Care Provider: Joseph Dias MD   Date and time admitted to hospital: 12/14/2019 11:05 AM        * Visual hallucinations  Assessment & Plan  · Background - Patient reports visual hallucinations starting the evening of 12/9/2019  Patient describes hallucinations as either small children were grown adult men  She states that the children do not bother her or threaten her, but the adult men do make threatening gestures toward her  Neither hallucinations talk to her, and when she speak to them they do not respond  Previously, had hallucinations approximately 10 years ago was placed on Abilify with improvement of hallucinations  It is entirely unclear if hallucinations are truly only new over the last 5 days versus have been ongoing for some period of time and patient has not been alerting staff at assisted living facility  She has already been evaluated at [facility] and had undergone infectious workup that was overall negative  CT scan of the head was within normal limits  · Evaluation for neurological cause initially -   · Sinemet dosing adjustment being made by neurology as this is potential cause  · Sinemet dosing adjustment - 2 5 tabs at 7:30, 2 tabs at 10:30 am, 2 5 tabs at 1:30 pm, and 2 tabs at 4:30 pm   · Starting on Rivastigmine 3 mg bid  · Might consider pimvanserin in outpatient setting during follow up  · Seen by neurology this admission  · Evaluate for psychiatric causes -   · Consider psychiatry evaluation if not responding to treatment for suspected neurological causes  · No clear infectious or metabolic etiology at present time  Parkinson's disease ([facility] [address] )  Assessment & Plan  · Continue sinemet, but dose adjustments being made as described above  · Neurology following      Neurologic gait dysfunction  Assessment & Plan  · Uses walker, wheelchair  · Supportive care  PT evaluation  Essential hypertension  Assessment & Plan  · Continue home BP medications  · Monitor blood pressures  Type 2 diabetes mellitus, with long-term current use of insulin [facility])  Assessment & Plan  No results found for: HGBA1C    Recent Labs     12/14/19  1625 12/14/19  2055 12/15/19  0720 12/15/19  1135   POCGLU 152* 197* 157* 164*     · Hold oral antihyperglycemics  · SSI for correction  No basal / bolus needed as glucose levels are OK  · Monitor blood sugars  Holosystolic murmur  Assessment & Plan  · Suspected aortic stenosis  · Echo should be ordered as an outpatient  VTE Pharmacologic Prophylaxis:   Pharmacologic: Enoxaparin (Lovenox)  Mechanical VTE Prophylaxis in Place: Yes    Patient Centered Rounds: I have performed bedside rounds with nursing staff today  Discussions with Specialists or Other Care Team Provider: Dr Morgan Fox    Education and Discussions with Family / Patient: updated daughter, Yancy Clark, at     Time Spent for Care: 30 minutes  More than 50% of total time spent on counseling and coordination of care as described above  Current Length of Stay: 1 day(s)  Current Patient Status: Inpatient     Certification Statement: The patient will continue to require additional inpatient hospital stay due to evaluation and treatment for above medical conditions, namely medication adjustments and monitoring of symptoms  Discharge Plan / Estimated Discharge Date: next 24 - 48 hours  Code Status: Level 3 - DNAR and DNI      Subjective: The patient has no acute complaints at the present time  Nursing does report that last night the patient had some more hallucinations and paranoia through full that the  were coming to get her  The patient has also had some threatening hallucinations as well    The patient chronically has blurred vision and does not notice any particular difference or at least that she can tell me any recent changes in the blurred vision  She denies any headaches  No nausea or vomiting  However, nursing states that the patient has a poor appetite currently  Objective:     Vitals:   Temp (24hrs), Av 4 °F (36 9 °C), Min:98 1 °F (36 7 °C), Max:98 7 °F (37 1 °C)    Temp:  [98 1 °F (36 7 °C)-98 7 °F (37 1 °C)] 98 3 °F (36 8 °C)  HR:  [63-81] 63  Resp:  [16] 16  BP: (148-177)/(70-85) 148/70  SpO2:  [94 %-97 %] 95 %  Body mass index is 31 17 kg/m²  Input and Output Summary (last 24 hours): Intake/Output Summary (Last 24 hours) at 12/15/2019 1518  Last data filed at 12/15/2019 0943  Gross per 24 hour   Intake 0 ml   Output 430 ml   Net -430 ml       Physical Exam:     Physical Exam   Constitutional: No distress  HENT:   Mouth/Throat: No oropharyngeal exudate  Slightly dry oral mucosa   Eyes: Pupils are equal, round, and reactive to light  Neck: Neck supple  Cardiovascular: Normal rate and regular rhythm  Murmur (Holosystolic) heard  Pulmonary/Chest: Effort normal  She has no wheezes  She has no rales  Abdominal: Soft  Bowel sounds are normal  She exhibits no distension  There is no tenderness  Musculoskeletal: She exhibits no edema ( versus trace edema with chronic venous stasis dermatitis)  Neurological:   While the patient maintains attention she seems distracted at times  The patient is able to answer questions to some degree but gives the most minimal less answer possible  The patient has motor strength is 4/5 to all extremities and symmetric  Some contractures noted in the fingers of the left hand  No facial droop  Cranial nerves grossly intact  Vitals reviewed      Additional Data:     Labs:    Results from last 7 days   Lab Units 12/15/19  0530 19  1132   WBC Thousand/uL 8 83 7 25   HEMOGLOBIN g/dL 13 3 14 1   HEMATOCRIT % 41 6 45 1   PLATELETS Thousands/uL 157 160   NEUTROS PCT %  --  65   LYMPHS PCT %  --  24   MONOS PCT %  --  8   EOS PCT %  --  2     Results from last 7 days   Lab Units 12/15/19  0530 12/14/19  1133   POTASSIUM mmol/L 3 7 5 0   CHLORIDE mmol/L 105 105   CO2 mmol/L 27 33*   BUN mg/dL 17 20   CREATININE mg/dL 1 12 1 22   CALCIUM mg/dL 8 8 8 8   ALK PHOS U/L  --  75   ALT U/L  --  11*   AST U/L  --  14           * I Have Reviewed All Lab Data Listed Above  * Additional Pertinent Lab Tests Reviewed: All Labs Within Last 24 Hours Reviewed    Imaging:    Imaging Reports Reviewed Today Include: No new imaging    Imaging Personally Reviewed by Myself Includes:  None    Recent Cultures (last 7 days):           Last 24 Hours Medication List:     Current Facility-Administered Medications:  acetaminophen 325 mg Oral Q4H PRN Isabell Babin PA-C   amLODIPine 2 5 mg Oral Daily Isabell Babin PA-C   amLODIPine 5 mg Oral Daily Isabell Babin PA-C   ARIPiprazole 10 mg Oral Daily Isabell Babin PA-C   aspirin 81 mg Oral Daily Isabell Babin PA-C   calcium carbonate 1,000 mg Oral Daily PRN Isabell Babin PA-C   carbidopa-levodopa 2 tablet Oral BID Julio Simpson MD   carbidopa-levodopa 2 5 tablet Oral BID Julio Simpson MD   cholecalciferol 1,000 Units Oral Daily Isabell Babin PA-C   vitamin B-12 500 mcg Oral HS Isabell Babin PA-C   dextran 70-hypromellose 1 drop Both Eyes Q12H [facility] Isabell Babin PA-C   enoxaparin 30 mg Subcutaneous Daily Isabell Babin PA-C   furosemide 20 mg Oral Daily Isabell Babin PA-C   guaiFENesin 200 mg Oral 4x Daily PRN Isabell Babin PA-C   insulin lispro 1-5 Units Subcutaneous TID AC Isabell Babin PA-C   insulin lispro 1-5 Units Subcutaneous HS Isabell Babin PA-C   melatonin 3 mg Oral HS Gustavo Miranda PA-C   metoprolol succinate 100 mg Oral HS Isabell A Hillegass, PA-C   ondansetron 4 mg Intravenous Q6H PRN Isabell Babin PA-C   polyethylene glycol 17 g Oral Daily Isabell Babin PA-C   pravastatin 40 mg Oral Daily With [employer] A SAVANNAH Babin   rivastigmine 3 mg Oral BID With [employer] Francis Pichardo MD   senna 1 tablet Oral BID Isabell Babin PA-C        Today, Patient Was Seen By: Shannan Hernandez DO    ** Please Note: This note has been constructed using a voice recognition system   **

## 2019-12-16 PROBLEM — R11.2 NAUSEA & VOMITING: Status: ACTIVE | Noted: 2019-01-01

## 2019-12-16 NOTE — SOCIAL WORK
CM called and LMOVM for [facility] ([phone]) where patient is a resident  CM LMOVM requesting return call to coordinate discharge planning as patient is tentative for discharge in 24 hrs as per SLIM rounds this morning  CM Department will continue to follow patient  As per RN, patient will require BLS to return as she is unable to complete stand and pivot and has history of Parkinson's and current visual hallucinations

## 2019-12-16 NOTE — ASSESSMENT & PLAN NOTE
· Continue sinemet, but dose adjustments being made as described above  · Neurology following    · Will address with attending and consider physical therapy evaluation

## 2019-12-16 NOTE — PLAN OF CARE
Problem: Potential for Falls  Goal: Patient will remain free of falls  Description  INTERVENTIONS:  - Assess patient frequently for physical needs  -  Identify cognitive and physical deficits and behaviors that affect risk of falls    -  [place] fall precautions as indicated by assessment   - Educate patient/family on patient safety including physical limitations  - Instruct patient to call for assistance with activity based on assessment  - Modify environment to reduce risk of injury  - Consider OT/PT consult to assist with strengthening/mobility  Outcome: Progressing     Problem: Prexisting or High Potential for Compromised Skin Integrity  Goal: Skin integrity is maintained or improved  Description  INTERVENTIONS:  - Identify patients at risk for skin breakdown  - Assess and monitor skin integrity  - Assess and monitor nutrition and hydration status  - Monitor labs   - Assess for incontinence   - Turn and reposition patient  - Assist with mobility/ambulation  - Relieve pressure over bony prominences  - Avoid friction and shearing  - Provide appropriate hygiene as needed including keeping skin clean and dry  - Evaluate need for skin moisturizer/barrier cream  - Collaborate with interdisciplinary team   - Patient/family teaching  - Consider wound care consult   Outcome: Progressing     Problem: PAIN - ADULT  Goal: Verbalizes/displays adequate comfort level or baseline comfort level  Description  Interventions:  - Encourage patient to monitor pain and request assistance  - Assess pain using appropriate pain scale  - Administer analgesics based on type and severity of pain and evaluate response  - Implement non-pharmacological measures as appropriate and evaluate response  - Consider cultural and social influences on pain and pain management  - Notify physician/advanced practitioner if interventions unsuccessful or patient reports new pain  Outcome: Progressing     Problem: INFECTION - ADULT  Goal: Absence or prevention of progression during hospitalization  Description  INTERVENTIONS:  - Assess and monitor for signs and symptoms of infection  - Monitor lab/diagnostic results  - Monitor all insertion sites, i e  indwelling lines, tubes, and drains  - Monitor endotracheal if appropriate and nasal secretions for changes in amount and color  - [place] appropriate cooling/warming therapies per order  - Administer medications as ordered  - Instruct and encourage patient and family to use good hand hygiene technique  - Identify and instruct in appropriate isolation precautions for identified infection/condition  Outcome: Progressing  Goal: Absence of fever/infection during neutropenic period  Description  INTERVENTIONS:  - Monitor WBC    Outcome: Progressing     Problem: SAFETY ADULT  Goal: Patient will remain free of falls  Description  INTERVENTIONS:  - Assess patient frequently for physical needs  -  Identify cognitive and physical deficits and behaviors that affect risk of falls    -  [place] fall precautions as indicated by assessment   - Educate patient/family on patient safety including physical limitations  - Instruct patient to call for assistance with activity based on assessment  - Modify environment to reduce risk of injury  - Consider OT/PT consult to assist with strengthening/mobility  Outcome: Progressing  Goal: Maintain or return to baseline ADL function  Description  INTERVENTIONS:  -  Assess patient's ability to carry out ADLs; assess patient's baseline for ADL function and identify physical deficits which impact ability to perform ADLs (bathing, care of mouth/teeth, toileting, grooming, dressing, etc )  - Assess/evaluate cause of self-care deficits   - Assess range of motion  - Assess patient's mobility; develop plan if impaired  - Assess patient's need for assistive devices and provide as appropriate  - Encourage maximum independence but intervene and supervise when necessary  - Involve family in performance of ADLs  - Assess for home care needs following discharge   - Consider OT consult to assist with ADL evaluation and planning for discharge  - Provide patient education as appropriate  Outcome: Progressing  Goal: Maintain or return mobility status to optimal level  Description  INTERVENTIONS:  - Assess patient's baseline mobility status (ambulation, transfers, stairs, etc )    - Identify cognitive and physical deficits and behaviors that affect mobility  - Identify mobility aids required to assist with transfers and/or ambulation (gait belt, sit-to-stand, lift, walker, cane, etc )  - [place] fall precautions as indicated by assessment  - Record patient progress and toleration of activity level on Mobility SBAR; progress patient to next Phase/Stage  - Instruct patient to call for assistance with activity based on assessment  - Consider rehabilitation consult to assist with strengthening/weightbearing, etc   Outcome: Progressing     Problem: DISCHARGE PLANNING  Goal: Discharge to home or other facility with appropriate resources  Description  INTERVENTIONS:  - Identify barriers to discharge w/patient and caregiver  - Arrange for needed discharge resources and transportation as appropriate  - Identify discharge learning needs (meds, wound care, etc )  - Arrange for interpretive services to assist at discharge as needed  - Refer to Case Management Department for coordinating discharge planning if the patient needs post-hospital services based on physician/advanced practitioner order or complex needs related to functional status, cognitive ability, or social support system  Outcome: Progressing     Problem: Knowledge Deficit  Goal: Patient/family/caregiver demonstrates understanding of disease process, treatment plan, medications, and discharge instructions  Description  Complete learning assessment and assess knowledge base    Interventions:  - Provide teaching at level of understanding  - Provide teaching via preferred learning methods  Outcome: Progressing

## 2019-12-16 NOTE — ASSESSMENT & PLAN NOTE
· Patient presented systolic blood pressure of 175 mmHg this a m , asymptomatic  Denies headache, dizziness, lightheadedness, shortness of breath, or chest pain  · It was noted the patient missed a dose of amlodipine yesterday, as she was on a different schedule dosing for amlodipine, which has now been addressed and corrected to a dear to her outpatient schedule  · Will continue to monitor closely  · Even control hypertension persists, will consider either changing current dosages for starting a new medication  · Patient is currently on amlodipine 5 mg, amlodipine 2 5 mg, and metoprolol 100 mg q h s

## 2019-12-16 NOTE — PROGRESS NOTES
Progress Note - Alvaro Matter 1933, 80 y o  female MRN: [id number]    Unit/Bed#: S -01 Encounter: [id number]    Primary Care Provider: Enrique Jalloh MD   Date and time admitted to hospital: 12/14/2019 11:05 AM        * Visual hallucinations  Assessment & Plan  · Background - Patient reports visual hallucinations starting the evening of 12/9/2019  Similar previous episode 10 years ago  · It is entirely unclear if hallucinations are new over the last 5 days versus have been ongoing for some period of time and patient has not been alerting staff at assisted living facility  · patient has been [facility] and had undergone infectious workup that was overall negative  CT scan of the head was within normal limits  · Evaluation for neurological cause initially -   · Sinemet dosing adjustment being made by neurology as this is potential cause  · Sinemet dosing adjustment - 2 5 tabs at 7:30, 2 tabs at 10:30 am, 2 5 tabs at 1:30 pm, and 2 tabs at 4:30 pm  Instated on 12/15  Will assess patient's response  Patient denies hallucinations yesterday or this a rony  Deyvi Margarita · Started on Rivastigmine 3 mg bid on 12/15  · Might consider pimvanserin in outpatient setting during follow up  · Evaluate for psychiatric causes -   · Consider psychiatry evaluation if not responding to treatment for suspected neurological causes  Will discuss with attending as to when to consider triggering consult  · No clear infectious or metabolic etiology at present time  Nausea & vomiting  Assessment & Plan  - patient complaining of nausea since admission and vomiting over 3 times yesterday  No vomiting this a rony Avila - patient is on Zofran p r n  But has not received any dosages  - discussed with patient at length and encouraged her to call nurses when patient felt nauseous so we can assess her need for Zofran and her response to p r n  Dosages    - discussed with nurse staff:  As per nursing sign out, the assessment of patient's vomiting was assessed as spit up", however, objectively patient is presenting persistent nausea  - will consider IV fluids the patient is unable to tolerate p o  Diet    Parkinson's disease ([address] )  Assessment & Plan  · Continue sinemet, but dose adjustments being made as described above  · Neurology following  · Will address with attending and consider physical therapy evaluation    Essential hypertension  Assessment & Plan  · Patient presented systolic blood pressure of 175 mmHg this a m , asymptomatic  Denies headache, dizziness, lightheadedness, shortness of breath, or chest pain  · It was noted the patient missed a dose of amlodipine yesterday, as she was on a different schedule dosing for amlodipine, which has now been addressed and corrected to a dear to her outpatient schedule  · Will continue to monitor closely  · Even control hypertension persists, will consider either changing current dosages for starting a new medication  · Patient is currently on amlodipine 5 mg, amlodipine 2 5 mg, and metoprolol 100 mg q h s  Neurologic gait dysfunction  Assessment & Plan  · Uses walker, wheelchair  · May be related to Parkinson's disease progression  · Supportive care  PT evaluation  Type 2 diabetes mellitus, with long-term current use of insulin [facility])  Assessment & Plan  No results found for: HGBA1C    Recent Labs     12/15/19  1135 12/15/19  1600 12/15/19  2113 12/16/19  0840   POCGLU 164* 154* 170* 144*     · Hold oral antihyperglycemics  · SSI for correction  No basal / bolus needed as glucose levels are OK  · Monitor blood sugars  Holosystolic murmur  Assessment & Plan  · Suspected aortic stenosis  · Echo will be ordered as an outpatient          VTE Pharmacologic Prophylaxis:   Pharmacologic: Enoxaparin (Lovenox)  Mechanical VTE Prophylaxis in Place: No    Discussions with Specialists or Other Care Team Provider:  Neurology and physical therapy  Education and Discussions with Family / Patient:  Discussed at length with patient  Answered all questions  Current Length of Stay: 2 day(s)    Current Patient Status: Inpatient     Discharge Plan / Estimated Discharge Date: Will defer to Neurology as patient is being assessed on no dosages of Sinemet regarding her visual hallucinations  Will also defer to the need for inpatient versus outpatient psych evaluation versus no need for psych evaluation at all  Code Status: Level 3 - DNAR and DNI      Subjective:   No overnight events as per sign out  Patient is complaining of nausea which she has had since prior to admission  Patient states she has vomited more than 3 times  Nurses report that    Objective:     Vitals:   Temp (24hrs), Av 2 °F (36 8 °C), Min:98 °F (36 7 °C), Max:98 3 °F (36 8 °C)    Temp:  [98 °F (36 7 °C)-98 3 °F (36 8 °C)] 98 3 °F (36 8 °C)  HR:  [63-84] 65  Resp:  [16-20] 20  BP: (148-175)/(70-82) 175/79  SpO2:  [94 %-95 %] 95 %  Body mass index is 31 17 kg/m²  Input and Output Summary (last 24 hours): Intake/Output Summary (Last 24 hours) at 2019 1143  Last data filed at 12/15/2019 2301  Gross per 24 hour   Intake 120 ml   Output 590 ml   Net -470 ml       Physical Exam:     Physical Exam   Constitutional: She is oriented to person, place, and time  She appears well-developed and well-nourished  No distress  HENT:   Head: Normocephalic and atraumatic  Eyes: Pupils are equal, round, and reactive to light  EOM are normal    Neck: Normal range of motion  Neck supple  Cardiovascular: Normal rate and regular rhythm  Murmur heard  Pulmonary/Chest: Effort normal and breath sounds normal  No respiratory distress  She has no wheezes  She has no rales  Abdominal: Soft  Bowel sounds are normal  She exhibits no distension  There is no tenderness  There is no rebound and no guarding  Musculoskeletal: She exhibits no edema, tenderness or deformity  Neurological: She is alert and oriented to person, place, and time  She displays normal reflexes  No cranial nerve deficit or sensory deficit  She exhibits normal muscle tone  Coordination normal    Resting tremor   Skin: Capillary refill takes less than 2 seconds  No rash noted  No erythema  Psychiatric: Her behavior is normal  Thought content normal            Additional Data:     Labs:    Results from last 7 days   Lab Units 12/15/19  0530 12/14/19  1132   WBC Thousand/uL 8 83 7 25   HEMOGLOBIN g/dL 13 3 14 1   HEMATOCRIT % 41 6 45 1   PLATELETS Thousands/uL 157 160   NEUTROS PCT %  --  65   LYMPHS PCT %  --  24   MONOS PCT %  --  8   EOS PCT %  --  2     Results from last 7 days   Lab Units 12/16/19  0453  12/14/19  1133   POTASSIUM mmol/L 3 5   < > 5 0   CHLORIDE mmol/L 101   < > 105   CO2 mmol/L 33*   < > 33*   BUN mg/dL 15   < > 20   CREATININE mg/dL 1 25   < > 1 22   CALCIUM mg/dL 8 8   < > 8 8   ALK PHOS U/L  --   --  75   ALT U/L  --   --  11*   AST U/L  --   --  14    < > = values in this interval not displayed  * I Have Reviewed All Lab Data Listed Above  * Additional Pertinent Lab Tests Reviewed:  [address] Admission Reviewed    Imaging:    Imaging Reports Reviewed Today Include: none  Imaging Personally Reviewed by Myself Includes:  none    Recent Cultures (last 7 days):           Last 24 Hours Medication List:     Current Facility-Administered Medications:  acetaminophen 325 mg Oral Q4H PRN Isabell Babin PA-C   amLODIPine 2 5 mg Oral Daily cL Rand MD   amLODIPine 5 mg Oral Daily Isabell Babin PA-C   ARIPiprazole 10 mg Oral Daily Isabell Babin PA-C   aspirin 81 mg Oral Daily Isabell Babin PA-C   calcium carbonate 1,000 mg Oral Daily PRN Isabell Babin PA-C   carbidopa-levodopa 2 tablet Oral BID Janey Hicks MD   carbidopa-levodopa 2 5 tablet Oral BID Janey Hicks MD   cholecalciferol 1,000 Units Oral Daily Lena FLORES SAVANNAH Babin   vitamin B-12 500 mcg Oral HS Isabell Babin PA-C   dextran 70-hypromellose 1 drop Both Eyes Q12H [facility] Isabell Babin PA-C   enoxaparin 30 mg Subcutaneous Daily Isabell Babin PA-C   guaiFENesin 200 mg Oral 4x Daily PRN Isabell Babin PA-C   insulin lispro 1-5 Units Subcutaneous TID AC Isabell Babin PA-C   insulin lispro 1-5 Units Subcutaneous HS Isabell Babin PA-C   melatonin 3 mg Oral HS Gustavo Miranda PA-C   metoprolol succinate 100 mg Oral HS Isbaell Babin PA-C   ondansetron 4 mg Intravenous Q6H PRN Isabell Babin PA-C   polyethylene glycol 17 g Oral Daily Isabell Babin PA-C   pravastatin 40 mg Oral Daily With [employer] Babin PA-C   rivastigmine 3 mg Oral BID With Meals Luis E Balbuena MD   senna 1 tablet Oral BID Isabell Babin PA-C        Today, Patient Was Seen By: Orlando Pennington MD    ** Please Note: This note has been constructed using a voice recognition system   **

## 2019-12-16 NOTE — ASSESSMENT & PLAN NOTE
- patient complaining of nausea since admission and vomiting over 3 times yesterday  No vomiting this a m  Carlos Ruiz - patient is on Zofran p r n  But has not received any dosages  - discussed with patient at length and encouraged her to call nurses when patient felt nauseous so we can assess her need for Zofran and her response to p r n  Dosages  - discussed with nurse staff:  As per nursing sign out, the assessment of patient's vomiting was assessed as spit up", however, objectively patient is presenting persistent nausea  - will consider IV fluids the patient is unable to tolerate p o   Diet

## 2019-12-16 NOTE — ASSESSMENT & PLAN NOTE
· Background - Patient reports visual hallucinations starting the evening of 12/9/2019  Similar previous episode 10 years ago  · It is entirely unclear if hallucinations are new over the last 5 days versus have been ongoing for some period of time and patient has not been alerting staff at assisted living facility  · patient has been evaluated at [facility] and had undergone infectious workup that was overall negative  CT scan of the head was within normal limits  · Evaluation for neurological cause initially -   · Sinemet dosing adjustment being made by neurology as this is potential cause  · Sinemet dosing adjustment - 2 5 tabs at 7:30, 2 tabs at 10:30 am, 2 5 tabs at 1:30 pm, and 2 tabs at 4:30 pm  Instated on 12/15  Will assess patient's response  Patient denies hallucinations yesterday or this iggy Alexis · Started on Rivastigmine 3 mg bid on 12/15  · Might consider pimvanserin in outpatient setting during follow up  · Evaluate for psychiatric causes -   · Consider psychiatry evaluation if not responding to treatment for suspected neurological causes  Will discuss with attending as to when to consider triggering consult  · No clear infectious or metabolic etiology at present time

## 2019-12-16 NOTE — ASSESSMENT & PLAN NOTE
· Uses walker, wheelchair  · May be related to Parkinson's disease progression  · Supportive care  PT evaluation

## 2019-12-16 NOTE — ASSESSMENT & PLAN NOTE
No results found for: HGBA1C    Recent Labs     12/15/19  1135 12/15/19  1600 12/15/19  2113 12/16/19  0840   POCGLU 164* 154* 170* 144*     · Hold oral antihyperglycemics  · SSI for correction  No basal / bolus needed as glucose levels are OK  · Monitor blood sugars

## 2019-12-16 NOTE — PLAN OF CARE
Problem: Potential for Falls  Goal: Patient will remain free of falls  Description  INTERVENTIONS:  - Assess patient frequently for physical needs  -  Identify cognitive and physical deficits and behaviors that affect risk of falls    -  [place] fall precautions as indicated by assessment   - Educate patient/family on patient safety including physical limitations  - Instruct patient to call for assistance with activity based on assessment  - Modify environment to reduce risk of injury  - Consider OT/PT consult to assist with strengthening/mobility  Outcome: Progressing     Problem: Prexisting or High Potential for Compromised Skin Integrity  Goal: Skin integrity is maintained or improved  Description  INTERVENTIONS:  - Identify patients at risk for skin breakdown  - Assess and monitor skin integrity  - Assess and monitor nutrition and hydration status  - Monitor labs   - Assess for incontinence   - Turn and reposition patient  - Assist with mobility/ambulation  - Relieve pressure over bony prominences  - Avoid friction and shearing  - Provide appropriate hygiene as needed including keeping skin clean and dry  - Evaluate need for skin moisturizer/barrier cream  - Collaborate with interdisciplinary team   - Patient/family teaching  - Consider wound care consult   Outcome: Progressing     Problem: PAIN - ADULT  Goal: Verbalizes/displays adequate comfort level or baseline comfort level  Description  Interventions:  - Encourage patient to monitor pain and request assistance  - Assess pain using appropriate pain scale  - Administer analgesics based on type and severity of pain and evaluate response  - Implement non-pharmacological measures as appropriate and evaluate response  - Consider cultural and social influences on pain and pain management  - Notify physician/advanced practitioner if interventions unsuccessful or patient reports new pain  Outcome: Progressing     Problem: INFECTION - ADULT  Goal: Absence or prevention of progression during hospitalization  Description  INTERVENTIONS:  - Assess and monitor for signs and symptoms of infection  - Monitor lab/diagnostic results  - Monitor all insertion sites, i e  indwelling lines, tubes, and drains  - Monitor endotracheal if appropriate and nasal secretions for changes in amount and color  - [place] appropriate cooling/warming therapies per order  - Administer medications as ordered  - Instruct and encourage patient and family to use good hand hygiene technique  - Identify and instruct in appropriate isolation precautions for identified infection/condition  Outcome: Progressing  Goal: Absence of fever/infection during neutropenic period  Description  INTERVENTIONS:  - Monitor WBC    Outcome: Progressing     Problem: SAFETY ADULT  Goal: Patient will remain free of falls  Description  INTERVENTIONS:  - Assess patient frequently for physical needs  -  Identify cognitive and physical deficits and behaviors that affect risk of falls    -  [place] fall precautions as indicated by assessment   - Educate patient/family on patient safety including physical limitations  - Instruct patient to call for assistance with activity based on assessment  - Modify environment to reduce risk of injury  - Consider OT/PT consult to assist with strengthening/mobility  Outcome: Progressing  Goal: Maintain or return to baseline ADL function  Description  INTERVENTIONS:  -  Assess patient's ability to carry out ADLs; assess patient's baseline for ADL function and identify physical deficits which impact ability to perform ADLs (bathing, care of mouth/teeth, toileting, grooming, dressing, etc )  - Assess/evaluate cause of self-care deficits   - Assess range of motion  - Assess patient's mobility; develop plan if impaired  - Assess patient's need for assistive devices and provide as appropriate  - Encourage maximum independence but intervene and supervise when necessary  - Involve family in performance of ADLs  - Assess for home care needs following discharge   - Consider OT consult to assist with ADL evaluation and planning for discharge  - Provide patient education as appropriate  Outcome: Progressing  Goal: Maintain or return mobility status to optimal level  Description  INTERVENTIONS:  - Assess patient's baseline mobility status (ambulation, transfers, stairs, etc )    - Identify cognitive and physical deficits and behaviors that affect mobility  - Identify mobility aids required to assist with transfers and/or ambulation (gait belt, sit-to-stand, lift, walker, cane, etc )  - [place] fall precautions as indicated by assessment  - Record patient progress and toleration of activity level on Mobility SBAR; progress patient to next Phase/Stage  - Instruct patient to call for assistance with activity based on assessment  - Consider rehabilitation consult to assist with strengthening/weightbearing, etc   Outcome: Progressing     Problem: DISCHARGE PLANNING  Goal: Discharge to home or other facility with appropriate resources  Description  INTERVENTIONS:  - Identify barriers to discharge w/patient and caregiver  - Arrange for needed discharge resources and transportation as appropriate  - Identify discharge learning needs (meds, wound care, etc )  - Arrange for interpretive services to assist at discharge as needed  - Refer to Case Management Department for coordinating discharge planning if the patient needs post-hospital services based on physician/advanced practitioner order or complex needs related to functional status, cognitive ability, or social support system  Outcome: Progressing     Problem: Knowledge Deficit  Goal: Patient/family/caregiver demonstrates understanding of disease process, treatment plan, medications, and discharge instructions  Description  Complete learning assessment and assess knowledge base    Interventions:  - Provide teaching at level of understanding  - Provide teaching via preferred learning methods  Outcome: Progressing     Problem: Nutrition/Hydration-ADULT  Goal: Nutrient/Hydration intake appropriate for improving, restoring or maintaining nutritional needs  Description  Monitor and assess patient's nutrition/hydration status for malnutrition  Collaborate with interdisciplinary team and initiate plan and interventions as ordered  Monitor patient's weight and dietary intake as ordered or per policy  Utilize nutrition screening tool and intervene as necessary  Determine patient's food preferences and provide high-protein, high-caloric foods as appropriate       INTERVENTIONS:  - Monitor oral intake, urinary output, labs, and treatment plans  - Assess nutrition and hydration status and recommend course of action  - Evaluate amount of meals eaten  - Assist patient with eating if necessary   - Allow adequate time for meals  - Recommend/ encourage appropriate diets, oral nutritional supplements, and vitamin/mineral supplements  - Order, calculate, and assess calorie counts as needed  - Recommend, monitor, and adjust tube feedings and TPN/PPN based on assessed needs  - Assess need for intravenous fluids  - Provide specific nutrition/hydration education as appropriate  - Include patient/family/caregiver in decisions related to nutrition  Outcome: Progressing

## 2019-12-17 NOTE — ASSESSMENT & PLAN NOTE
No results found for: HGBA1C    Recent Labs     12/16/19  1201 12/16/19  1611 12/16/19  2113 12/17/19  0815   POCGLU 141* 173* 152* 114     · Currently Holding oral antihyperglycemics  · Will continue to Monitor blood sugars

## 2019-12-17 NOTE — ASSESSMENT & PLAN NOTE
· Patient presented systolic blood pressure of 175 mmHg this a m , asymptomatic  Denies headache, dizziness, lightheadedness, shortness of breath, or chest pain  · It was noted the patient missed a dose of amlodipine upon admission, as she was on a different schedule dosing for amlodipine, which has now been addressed and corrected to a dear to her outpatient schedule  · Will continue to monitor closely  · Patient is currently on amlodipine 5 mg, amlodipine 2 5 mg, and metoprolol 100 mg q h s

## 2019-12-17 NOTE — ASSESSMENT & PLAN NOTE
· Continue sinemet, but dose adjustments being made as described above  · Neurology following  · PT and OT evaluated patient  Assessment is noted

## 2019-12-17 NOTE — PROGRESS NOTES
Progress Note - Stefany Baker 1933, 80 y o  female MRN: [id number]    Unit/Bed#: S -01 Encounter: [id number]    Primary Care Provider: Yomi Florian MD   Date and time admitted to hospital: 12/14/2019 11:05 AM        * Visual hallucinations  Assessment & Plan  · Patient reports visual hallucinations on the evening of 12/9/2019  Similar previous episode 10 years ago  · It is unclear if hallucinations are new over the last 5 days versus have been ongoing for some period of time and patient has not been alerting staff at assisted living facility  · patient has been evaluated at [facility] and had undergone infectious workup that was overall negative  CT scan of the head was within normal limits  · Evaluation for neurological cause initially -   · Sinemet dosing adjustment being made by neurology as this may be related to Parkinson's disease  · Sinemet dosing adjustment - 2 5 tabs at 7:30, 2 tabs at 10:30 am, 2 5 tabs at 1:30 pm, and 2 tabs at 4:30 pm  Instated on 12/15  Will assess patient's response  Patient denies hallucinations since admission  · Started on Rivastigmine 3 mg bid on 12/15  · Might consider pimvanserin in outpatient setting during follow up  · No clear infectious or metabolic etiology at present time  Nausea & vomiting  Assessment & Plan  - patient complaining of nausea since admission  No vomiting this iggy Mcgraw - patient is on Zofran p r n     - discussed with patient at length and encouraged her to call nurses when patient felt nauseous so we can assess her need for Zofran and her response to p r n  Dosages  - discussed with nurse staff:  As per nursing sign out, the assessment of patient's vomiting is assessed as spit up", however, objectively patient is presenting persistent nausea  -will encourage advancing p o  Diet  Patient currently tolerating Glucerna drinks      Parkinson's disease ([facility] [address] )  Assessment & Plan  · Continue sinemet, but dose adjustments being made as described above  · Neurology following  · PT and OT evaluated patient  Assessment is noted  Essential hypertension  Assessment & Plan  · Patient presented systolic blood pressure of 175 mmHg this a m , asymptomatic  Denies headache, dizziness, lightheadedness, shortness of breath, or chest pain  · It was noted the patient missed a dose of amlodipine upon admission, as she was on a different schedule dosing for amlodipine, which has now been addressed and corrected to a dear to her outpatient schedule  · Will continue to monitor closely  · Patient is currently on amlodipine 5 mg, amlodipine 2 5 mg, and metoprolol 100 mg q h s  Neurologic gait dysfunction  Assessment & Plan  · Uses walker, wheelchair  · May be related to Parkinson's disease progression  · Supportive care  PT/OT evaluation noted  Type 2 diabetes mellitus, with long-term current use of insulin [facility])  Assessment & Plan  No results found for: HGBA1C    Recent Labs     12/16/19  1201 12/16/19  1611 12/16/19  2113 12/17/19  0815   POCGLU 141* 173* 152* 114     · Currently Holding oral antihyperglycemics  · Will continue to Monitor blood sugars  Holosystolic murmur  Assessment & Plan  · Suspected aortic stenosis  · 2D Echo will be ordered as an outpatient  VTE Pharmacologic Prophylaxis:   Pharmacologic:    Mechanical VTE Prophylaxis in Place: No    Discussions with Specialists or Other Care Team Provider:  Neurology  Education and Discussions with Family / Patient: Discussed at length with patient  Answered all questions  Current Length of Stay: 3 day(s)    Current Patient Status: Inpatient     Discharge Plan / Estimated Discharge Date:  Likely tomorrow in a m       Code Status: Level 3 - DNAR and DNI      Subjective:   No overnight events  No overnight events as per nurse chart review  Patient is still complaining of nausea  No vomiting  Patient is tolerating p o  Glucerna    Encouraged p o  Intake and advancing diet  Patient denies any further visual hallucinations since admission  Objective:     Vitals:   Temp (24hrs), Av 6 °F (37 °C), Min:98 6 °F (37 °C), Max:98 6 °F (37 °C)    Temp:  [98 6 °F (37 °C)] 98 6 °F (37 °C)  HR:  [58-62] 59  Resp:  [18-20] 18  BP: (120-164)/(53-83) 154/83  SpO2:  [95 %-99 %] 99 %  Body mass index is 31 17 kg/m²  Input and Output Summary (last 24 hours): Intake/Output Summary (Last 24 hours) at 2019 1247  Last data filed at 2019 1145  Gross per 24 hour   Intake 120 ml   Output 150 ml   Net -30 ml       Physical Exam:     Physical Exam   Constitutional: She is oriented to person, place, and time  She appears well-developed and well-nourished  No distress  HENT:   Head: Normocephalic and atraumatic  Eyes: Pupils are equal, round, and reactive to light  EOM are normal    Neck: Normal range of motion  Neck supple  Cardiovascular: Normal rate and regular rhythm  Murmur heard  Pulmonary/Chest: Effort normal and breath sounds normal  No stridor  No respiratory distress  She has no wheezes  She has no rales  She exhibits no tenderness  Abdominal: Soft  Bowel sounds are normal  She exhibits no distension  There is no tenderness  There is no rebound  Musculoskeletal: Normal range of motion  She exhibits no edema or deformity  Neurological: She is alert and oriented to person, place, and time  She displays normal reflexes  No cranial nerve deficit or sensory deficit  She exhibits normal muscle tone  Coordination normal    Skin: Skin is warm and dry  Capillary refill takes less than 2 seconds  She is not diaphoretic  Nursing note and vitals reviewed            Additional Data:     Labs:    Results from last 7 days   Lab Units 12/15/19  0530 19  1132   WBC Thousand/uL 8 83 7 25   HEMOGLOBIN g/dL 13 3 14 1   HEMATOCRIT % 41 6 45 1   PLATELETS Thousands/uL 157 160   NEUTROS PCT %  --  65   LYMPHS PCT %  --  24   MONOS PCT %  --  8 EOS PCT %  --  2     Results from last 7 days   Lab Units 12/16/19  0453  12/14/19  1133   POTASSIUM mmol/L 3 5   < > 5 0   CHLORIDE mmol/L 101   < > 105   CO2 mmol/L 33*   < > 33*   BUN mg/dL 15   < > 20   CREATININE mg/dL 1 25   < > 1 22   CALCIUM mg/dL 8 8   < > 8 8   ALK PHOS U/L  --   --  75   ALT U/L  --   --  11*   AST U/L  --   --  14    < > = values in this interval not displayed  * I Have Reviewed All Lab Data Listed Above  * Additional Pertinent Lab Tests Reviewed:  Nazanin Schultz Admission Reviewed    Imaging:    Imaging Reports Reviewed Today Include:  None  Imaging Personally Reviewed by Myself Includes:  None    Recent Cultures (last 7 days):           Last 24 Hours Medication List:     Current Facility-Administered Medications:  acetaminophen 325 mg Oral Q4H PRN Isabell Babin PA-C   amLODIPine 2 5 mg Oral Daily Natale Fleischer, MD   amLODIPine 5 mg Oral Daily Isabell Babin PA-C   ARIPiprazole 10 mg Oral Daily Isabell Babin PA-C   aspirin 81 mg Oral Daily Isabell Babin PA-C   calcium carbonate 1,000 mg Oral Daily PRN Isabell Babin PA-C   carbidopa-levodopa 2 tablet Oral BID Monique Castro MD   carbidopa-levodopa 2 5 tablet Oral BID Monique Castro MD   cholecalciferol 1,000 Units Oral Daily Isabell Babin PA-C   vitamin B-12 500 mcg Oral HS Isabell Babin PA-C   dextran 70-hypromellose 1 drop Both Eyes Q12H [address] Isabell Babin PA-C   enoxaparin 30 mg Subcutaneous Daily Isabell Babin PA-C   guaiFENesin 200 mg Oral 4x Daily PRN Isabell Babin PA-C   hydrALAZINE 10 mg Intravenous Q6H PRN Florecita Overcast, DO   insulin lispro 1-5 Units Subcutaneous TID AC Isabell Babin PA-C   insulin lispro 1-5 Units Subcutaneous HS Isabell Babin PA-C   melatonin 3 mg Oral HS Gustavo Miranda PA-C   metoprolol succinate 100 mg Oral HS Isabell Babin PA-C   ondansetron 4 mg Intravenous Q6H PRN Isabell Babin, SAVANNAH   pantoprazole 40 mg Intravenous Q24H [address] Willie Moseley MD   pravastatin 40 mg Oral Daily With Dinner Isabell Babin PA-C   rivastigmine 3 mg Oral BID With Meals Elder Baird MD   senna-docusate sodium 2 tablet Oral BID Willie Moseley MD        Today, Patient Was Seen By: Willie Moseley MD    ** Please Note: This note has been constructed using a voice recognition system   **

## 2019-12-17 NOTE — PROGRESS NOTES
Progress Note - Neurology   Stefany Baker 80 y o  female MRN: [id number]  Unit/Bed#: S -01 Encounter: [id number]    Assessment:  Stefany Baker is a 80 y o  female with Parkinson's disease, HTN, aortic stenosis, DM type 2 who presents to [place] [facility] ED on 12/14/2019 with worsening hallucinations  1  Parkinson's disease with hallucinations  Plan to continue on current regimen as described below and follow up with outpatient neurology  Concern new onset nausea may be related to addition of Rivastigmine  Additional recommendations as per Attending Neurologist     Plan:  -No neuro imaging needed at this time   -Continue ASA 81 mg daily  -Continue Sinemet  mg (2 5 tablets at 0730, 2 tablets at 1030, 2 5 tablets at 1330, and 2 tablets at 1630)  -Continue rivastigmine 3 mg BID  -Consider addition of Pimvanserin outpatient when following up with movement team  -Medical management per primary team  -Continue supportive care per primary team  -The patient should follow-up with outpatient neurology  Communication has been sent to the office to schedule a follow-up appointment   -No acute neurological recommendations at this time, please call with questions  Imaging/Labs:   -TSH elevated 4 187  -Labs WNL:  RDU, ammonia, coma panel, vitamin B12, MRSA culture    Subjective:   Patient states she no longer has hallucinations and feels like she is at her normal baseline  Patient does admit to increased nausea the past several days, however denies nausea currently  Denies CP, SOB, headache, dizziness, vision changes, N/V, abdominal pain, weakness or numbness  ROS:  12 point ROS performed, as stated above, all others negative  Vitals: Blood pressure 154/83, pulse 59, temperature 98 6 °F (37 °C), temperature source Oral, resp  rate 18, weight 72 4 kg (159 lb 9 8 oz), SpO2 99 %  ,Body mass index is 31 17 kg/m²  Physical Exam   Constitutional: She is oriented to person, place, and time   She appears well-developed and well-nourished  No distress  HENT:   Head: Normocephalic and atraumatic  Eyes: Pupils are equal, round, and reactive to light  Conjunctivae and EOM are normal  Right eye exhibits no discharge  Left eye exhibits no discharge  No scleral icterus  Neck: Normal range of motion  Neck supple  Cardiovascular: Normal rate and regular rhythm  Murmur heard  Pulmonary/Chest: Effort normal and breath sounds normal  No respiratory distress  She has no wheezes  Abdominal: Soft  Bowel sounds are normal  She exhibits no distension  There is no tenderness  Neurological: She is alert and oriented to person, place, and time  She has a normal Finger-Nose-Finger Test    Skin: Skin is warm and dry  No rash noted  She is not diaphoretic  No erythema  No pallor  Psychiatric: She has a normal mood and affect  Her behavior is normal  Judgment and thought content normal    Nursing note and vitals reviewed  Neurologic Exam     Mental Status   Oriented to person, place, and time  Patient is alert, lying in bed, accompanied by son  Oriented x3  No dysarthria or aphasia noted  Patient is able to name the president, name objects provided, follow multistep commands, answers all questions appropriately  Cranial Nerves     CN II   Visual fields full to confrontation  CN III, IV, VI   Pupils are equal, round, and reactive to light  Extraocular motions are normal    Nystagmus: none   Upgaze: normal  Downgaze: normal  Conjugate gaze: present    CN V   Facial sensation intact  CN VII   Facial expression full, symmetric       CN VIII   Hearing: intact    CN IX, X   Palate: symmetric    CN XI   CN XI normal      CN XII   Tongue deviation: none    Motor Exam   Muscle bulk: normal  Mild cogwheel rigidity noted in bilateral upper extremities  Bilateral lower extremities spasticity noted  Bilateral  strength symmetric, 5/5  Bilateral drift noted, no pronation noted bilaterally  Bilateral upper lower extremity strength testing 5/5 throughout     Sensory Exam   Light touch normal      Gait, Coordination, and Reflexes     Coordination   Finger to nose coordination: normal    Tremor   Resting tremor: absent    Reflexes   Right ankle clonus: absent  Left ankle clonus: absent  No dysmetria on bilateral finger-to-nose testing  No resting tremor noted  No involuntary movements noted  Gait testing deferred due to fall risk     Lab Results: I have personally reviewed pertinent reports  Recent Results (from the past 24 hour(s))   Fingerstick Glucose (POCT)    Collection Time: 12/16/19  4:11 PM   Result Value Ref Range    POC Glucose 173 (H) 65 - 140 mg/dl   Fingerstick Glucose (POCT)    Collection Time: 12/16/19  9:13 PM   Result Value Ref Range    POC Glucose 152 (H) 65 - 140 mg/dl   Fingerstick Glucose (POCT)    Collection Time: 12/17/19  8:15 AM   Result Value Ref Range    POC Glucose 114 65 - 140 mg/dl   Fingerstick Glucose (POCT)    Collection Time: 12/17/19 11:22 AM   Result Value Ref Range    POC Glucose 163 (H) 65 - 140 mg/dl   ]  Imaging Studies: I have personally reviewed pertinent reports and I have personally reviewed pertinent films in PACS  EKG, Pathology, and Other Studies: I have personally reviewed pertinent reports  VTE Prophylaxis: Enoxaparin (Lovenox)    Counseling / Coordination of Care  Total time spent today 30 minutes  Greater than 50% of total time was spent with the patient and/or family counseling and/or coordination of care  A description of the counseling/coordination of care:  Patient was seen and evaluated  Discussed with attending  Chart reviewed thoroughly including laboratory and imaging studies    Plan of care discussed with patient and primary team

## 2019-12-17 NOTE — ASSESSMENT & PLAN NOTE
· Patient reports visual hallucinations on the evening of 12/9/2019  Similar previous episode 10 years ago  · It is unclear if hallucinations are new over the last 5 days versus have been ongoing for some period of time and patient has not been alerting staff at assisted living facility  · patient has been evaluated at [facility] and had undergone infectious workup that was overall negative  CT scan of the head was within normal limits  · Evaluation for neurological cause initially -   · Sinemet dosing adjustment being made by neurology as this may be related to Parkinson's disease  · Sinemet dosing adjustment - 2 5 tabs at 7:30, 2 tabs at 10:30 am, 2 5 tabs at 1:30 pm, and 2 tabs at 4:30 pm  Instated on 12/15  Will assess patient's response  Patient denies hallucinations since admission  · Started on Rivastigmine 3 mg bid on 12/15  · Might consider pimvanserin in outpatient setting during follow up  · No clear infectious or metabolic etiology at present time

## 2019-12-17 NOTE — ASSESSMENT & PLAN NOTE
- patient complaining of nausea since admission  No vomiting this iggy Grant - patient is on Zofran p r n     - discussed with patient at length and encouraged her to call nurses when patient felt nauseous so we can assess her need for Zofran and her response to p r n  Dosages  - discussed with nurse staff:  As per nursing sign out, the assessment of patient's vomiting is assessed as spit up", however, objectively patient is presenting persistent nausea  -will encourage advancing p o  Diet  Patient currently tolerating Glucerna drinks

## 2019-12-17 NOTE — SOCIAL WORK
CM received call from Gracie Pizarro,  and LPN at [facility] where patient resides  Kendalia Moira reports that patient required assistance x1 for her ADLs, uses a walker and wheelchair for assistance  Gracie Pizarro of [facility] reports that patient may return when medically cleared  [facility] tel: [phone]  Fax [phone]  CM attempted to call daughter Seema Wray at [phone] (home) and [phone] (mobile), no answer, left message on mobile number requesting a callback

## 2019-12-17 NOTE — SOCIAL WORK
CM received callback from daughter Supa Zazueta ([phone]) that discharge plan is indeed back to [facility] provided that patient is at her baseline, daughter reports that she will transport

## 2019-12-17 NOTE — ASSESSMENT & PLAN NOTE
· Uses walker, wheelchair  · May be related to Parkinson's disease progression  · Supportive care  PT/OT evaluation noted

## 2019-12-18 NOTE — DISCHARGE SUMMARY
Discharge- Zara Vail 1933, 80 y o  female MRN: [id number]    Unit/Bed#: S -01 Encounter: [id number]    Primary Care Provider: Emi Cain MD   Date and time admitted to hospital: 12/14/2019 11:05 AM        * Visual hallucinations  Assessment & Plan  · Patient reported visual hallucinations on the evening of 12/9/2019  Similar previous episode 10 years ago  · No further visual hallucinations since admission  · As per Neurology, this might be related to Parkinson's disease  Patient Sinemet dosages and schedule what adjusted by Neurology  · Sinemet dosing adjustment being made by neurology as this may be related to Parkinson's disease  · Sinemet dosing adjustment - 2 5 tabs at 7:30, 2 tabs at 10:30 am, 2 5 tabs at 1:30 pm, and 2 tabs at 4:30 pm  Instated on 12/15  Will assess patient's response  Patient denies hallucinations since admission  · Started on Rivastigmine 3 mg bid on 12/15  Nausea & vomiting  Assessment & Plan  - nausea is now improved  Patient tolerating p o  Diet  Encouraged to increase her p o  Diet  - As per Neurology nausea my opinion related to the side effects of Rivastigmine that was started this admission  Parkinson's disease ([facility] [address] )  Assessment & Plan  · Continue sinemet, as per neurology's dose adjustments being made as described above  · Patient started on rivastigmine  Essential hypertension  Assessment & Plan  · Pressure is noted  · Patient Denies headache, dizziness, lightheadedness, shortness of breath, or chest pain  · It was noted the patient missed a dose of amlodipine upon admission, as she was on a different schedule dosing for amlodipine, which has now been addressed and corrected to a dear to her outpatient schedule  · Patient is currently on amlodipine 5 mg, amlodipine 2 5 mg, and metoprolol 100 mg q h s      Neurologic gait dysfunction  Assessment & Plan  · Uses walker, wheelchair  · May be related to Parkinson's disease progression  · Supportive care  PT/OT evaluation noted  Type 2 diabetes mellitus, with long-term current use of insulin [facility])  Assessment & Plan  No results found for: HGBA1C    Recent Labs     12/17/19  1122 12/17/19  1557 12/17/19  2043 12/18/19  0740   POCGLU 163* 130 166* 126     · Will reorder oral anti hyperglycemics        Holosystolic murmur  Assessment & Plan  · Suspected aortic stenosis  · 2D Echo will be ordered as an outpatient  Discharging Resident: oYlanda Hidalgo MD  Attending: Jose Xavier MD  PCP: Junior Franz MD  Admission Date: 12/14/2019  Discharge Date: 12/18/19    Disposition:      Other: [address] which is patient is living situation prior to admission  For Discharges to [facility]:   · Not Applicable to this Patient - Not Applicable to this Patient    Reason for Admission:  Visual hallucinations    Consultations During Hospital Stay:  · Neurology and physical therapy and occupational therapy    Procedures Performed:     · None    Medication Adjustments and Discharge Medications:  · Summary of Medication Adjustments made as a result of this hospitalization:     Sinemet:  2 5 tablets at 7:30 a m ; 2 tablets at 10:30 a m ; 2 5 tablets at 1:30 p m ; 2 tablets 4:30 p m      Rivastigmine: 3 mg b i d  With meals  · Medication Dosing Tapers - Please refer to Discharge Medication List for details on any medication dosing tapers (if applicable to patient)  · Medications being temporarily held (include recommended restart time):  Oral antidiabetic medications  · Discharge Medication List: See after visit summary for reconciled discharge medications  Wound Care Recommendations:  When applicable, please see wound care section of After Visit Summary      Diet Recommendations at Discharge:  Diet -        Diet Orders   (From admission, onward)             Start     Ordered    12/16/19 1119  Dietary nutrition supplements  Once     Question Answer Comment Select Supplement: Glucerna-Strawberry    Frequency Breakfast, Dinner        12/16/19 1118    12/15/19 1807  Diet Dysphagia/Modified Consistency; Dysphagia 1-Pureed; Thin Liquid  Diet effective now     Question Answer Comment   Diet Type Dysphagia/Modified Consistency    Dysphagia/Modified Consistency Dysphagia 1-Pureed    Liquid Modifier Thin Liquid    RD to adjust diet per protocol? Yes        12/15/19 1806    12/14/19 1808  Room Service  Once     Question:  Type of Service  Answer:  Room Service - Appropriate with Assistance    12/14/19 1807              Fluid Restriction - No Fluid Restriction at Discharge  Instructions for any Catheters / Lines Present at Discharge (including removal date, if applicable):  None    Significant Findings / Test Results:     · Systolic murmur  Needs to be investigated as an outpatient with the 2D echo  Incidental Findings:   · None     Test Results Pending at Discharge (will require follow up):   · 2D echo as an outpatient and a cardiology consult as an outpatient  Outpatient Tests Requested:  · 2D echo as an outpatient  Complications:  None    Hospital Course:     Joan Calderon is a 80 y o  female patient past medical history of Parkinson's disease and decreased memory who originally presented to the hospital on 12/14/2019 due to visual hallucinations  Patient had reported the had visual hallucinations more than 10 years back  Patient was seen by Neurology who advised that visual hallucinations can be related to Parkinson's disease  Patient's Sinemet dosages were adjusted throughout the day to:  2 5 tablets at 7:30 a m   2 tablets at 10:30 a m   2 5 tablets at 1:30 p m   2 tablets at 4:30 p m  Other adjustments recommended by Neurology include starting the patient on rivastigmine 3 mg b i d  With meals  During hospital stay the patient presented some nausea dry heaves  Neurology advised that this is a common side effect from starting Rivastigmine    Her overall nausea much improved and she is tolerating p o  Diet  In addition, patient was found to have a systolic murmur that had not been investigated in the past, for which it was recommended the patient to follow up with outpatient 2D echo and a cardiology consult as an outpatient  Patient was discharged in stable conditions  Condition at Discharge: stable     Discharge Day Visit / Exam:     Subjective:  No overnight events  Patient denies any visual hallucinations and patient denies any further nausea  Patient is tolerating p o  Diet  Vitals: Blood Pressure: 160/72 (12/18/19 0700)  Pulse: 58 (12/18/19 0700)  Temperature: 98 6 °F (37 °C) (12/18/19 0700)  Temp Source: Oral (12/18/19 0700)  Respirations: 17 (12/18/19 0700)  Weight - Scale: 72 4 kg (159 lb 9 8 oz) (12/14/19 1111)  SpO2: 96 % (12/18/19 0700)  Exam:   Physical Exam   Constitutional: She is oriented to person, place, and time  She appears well-developed and well-nourished  No distress  HENT:   Head: Normocephalic and atraumatic  Eyes: Pupils are equal, round, and reactive to light  EOM are normal    Neck: Normal range of motion  Neck supple  Cardiovascular: Normal rate, regular rhythm and intact distal pulses  Murmur heard  Pulmonary/Chest: Effort normal and breath sounds normal  No stridor  No respiratory distress  She has no wheezes  She has no rales  Abdominal: Soft  Bowel sounds are normal  She exhibits no distension  There is no tenderness  There is no rebound and no guarding  Musculoskeletal: Normal range of motion  She exhibits no edema or deformity  Neurological: She is alert and oriented to person, place, and time  She displays normal reflexes  No cranial nerve deficit or sensory deficit  Skin: Skin is warm and dry  Capillary refill takes less than 2 seconds  No rash noted  No erythema  No pallor  Psychiatric: She has a normal mood and affect   Her behavior is normal  Judgment and thought content normal    Nursing note and vitals reviewed  Discussion with Family:  Discussed at length with patient  Call daughter Patrice Lou and discussed at length  Answered all questions  Goals of Care Discussions:  · Code Status at Discharge: Level 3 - DNAR and DNI  · Were there any Goals of Care Discussions during Hospitalization?: Yes  · Results of any General Goals of Care Discussions:  Patient expressed her wishes to be DNR /DNI  · POLST Completed: No   · If POLST Completed, Summary of POLST Agreement Provided Here: n/a   · OK to Rehospitalize if Needed? Yes    Discharge instructions/Information to patient and family:   See after visit summary section titled Discharge Instructions for information provided to patient and family        Planned Readmission:  None planned      ** Please Note: This note has been constructed using a voice recognition system **

## 2019-12-18 NOTE — ASSESSMENT & PLAN NOTE
· Patient reported visual hallucinations on the evening of 12/9/2019  Similar previous episode 10 years ago  · No further visual hallucinations since admission  · As per Neurology, this might be related to Parkinson's disease  Patient Sinemet dosages and schedule what adjusted by Neurology  · Sinemet dosing adjustment being made by neurology as this may be related to Parkinson's disease  · Sinemet dosing adjustment - 2 5 tabs at 7:30, 2 tabs at 10:30 am, 2 5 tabs at 1:30 pm, and 2 tabs at 4:30 pm  Instated on 12/15  Will assess patient's response  Patient denies hallucinations since admission  · Started on Rivastigmine 3 mg bid on 12/15

## 2019-12-18 NOTE — ASSESSMENT & PLAN NOTE
No results found for: HGBA1C    Recent Labs     12/17/19  1122 12/17/19  1557 12/17/19  2043 12/18/19  0740   POCGLU 163* 130 166* 126     · Will reorder oral anti hyperglycemics

## 2019-12-18 NOTE — ASSESSMENT & PLAN NOTE
· Continue sinemet, as per neurology's dose adjustments being made as described above  · Patient started on rivastigmine

## 2019-12-18 NOTE — SOCIAL WORK
CM informed by SLIM that patient is medically stable for dc to [facility] today  CM called and spoke with patient's daughter Fabiola Vargas who reports she'll provide transport at 1 pm  IMM reviewed with Fabiola Vargas via phone  MICKEY, MARCE, facility all aware of discharge plan

## 2019-12-18 NOTE — ASSESSMENT & PLAN NOTE
- nausea is now improved  Patient tolerating p o  Diet  Encouraged to increase her p o  Diet  - As per Neurology nausea my opinion related to the side effects of Rivastigmine that was started this admission

## 2019-12-18 NOTE — PLAN OF CARE
Problem: PHYSICAL THERAPY ADULT  Goal: Performs mobility at highest level of function for planned discharge setting  See evaluation for individualized goals  Description  Treatment/Interventions: LE strengthening/ROM, Therapeutic exercise, Endurance training, Cognitive reorientation, Patient/family training, Equipment eval/education, Bed mobility(PT to assess transfers when appropriate)  Equipment Recommended: Wheelchair       See flowsheet documentation for full assessment, interventions and recommendations  Note:   Prognosis: Fair  Problem List: Decreased strength, Decreased range of motion, Decreased endurance, Impaired balance, Decreased mobility, Decreased cognition  Assessment: Loral Claude is a 81 y/o female who [facility] from [facility] w/ reports of increased mental confusion and visual hallucinations  Dx of Visual hallucinations  Order placed for PT eval and tx, w/ activity order of up w/ A and fall risk precautions  Pt presents w/ comorbidities of Parkinson's Disease, HTN, DMII, aortic stenosis and personal factors of advanced age, inability to navigate community distances, decreased cognition, anxiety, depression, inability to perform ADLs and use of wheelchair at baseline  Pt presents w/ weakness, decreased ROM, decreased endurance, impaired cognition, impaired balance, decreased safety awareness and fall risk  These impairments are evident in findings from physical examination (weakness and decreased ROM), mobility assessment (need for mod to max assist w/ all phases of mobility when usually mobilizing independently and tolerance to sitting EOB for only 12 minutes w/ mod to max A), and Barthel Index: 15/100  Pt needed input for task input and safety awareness  Pt is at risk for falls due to physical, safety awareness and cognition deficits   Pt's clinical presentation is unstable/unpredictable (progressive nature of PD, use of WC at baseline, need for heavy assistance w/ bed mobility, decreased cognition limiting command following)  Pt needs inpatient PT tx to improve mobility deficits  Discharge recommendation is for patient to return to [facility] in order to reduce fall risk and maximize level of functional independence  Recommendation: Other (Comment)(Return to [facility])          See flowsheet documentation for full assessment

## 2019-12-18 NOTE — PHYSICAL THERAPY NOTE
PHYSICAL THERAPY EVALUATION NOTE    Patient Name: Wilman Resendiz  [id number] Date: 2019    AGE:   80 y o  Mrn:   [id number]  ADMIT DX:  Hallucinations [R44 3]  Confusion [R41 0]    Past Medical History:   Diagnosis Date    Depression with anxiety     Diabetes ([facility] )     Edema     Essential hypertriglyceridemia     Hypercholesterolemia     Hypertension     Osteoarthritis of knee     Parkinson's disease ([facility] )      Length Of Stay: 4  PHYSICAL THERAPY EVALUATION :       19 3202   Note Type   Note type Eval only   Pain Assessment   Pain Assessment No/denies pain   Pain Score No Pain   Home Living   Type of Home Assisted living  [facility])   [address] [facility]; Wheelchair-manual   Prior Function   Level of [place] Needs assistance with ADLs and functional mobility; Needs assistance with IADLs   Lives With Facility staff   Receives Help From Other (Comment)  (Facility staff)   ADL Assistance Needs assistance   IADLs Needs assistance   Falls in the last 6 months 0   Vocational Retired   Comments Pt lives at [facility]  Per chart review, she has a  walker, uses wheelchair as her primary means of mobility  She states the staff helps her with most things  Restrictions/Precautions   Weight Bearing Precautions Per Order No   Other Precautions Cognitive; Chair Alarm; Bed Alarm; Fall Risk   General   Family/Caregiver Present No   Cognition   Arousal/Participation Cooperative   Orientation Level Oriented to person;Disoriented to situation  (Pt identified by first name and )   Following Commands Follows one step commands with increased time or repetition   Comments Pt is supine in bed upon arrival  She is agreeable to PT intervention  Ended session w/ pt supine in bed, with call bell, phone, bed alarm on w/ Resident in room with her      RUE Assessment   RUE Assessment WFL   LUE Assessment   LUE Assessment WFL   RLE Assessment   RLE Assessment WFL   Strength RLE   R Hip Flexion 3/5   R Knee Flexion 3/5   R Knee Extension 3/5   R Ankle Dorsiflexion 3/5   R Ankle Plantar Flexion 3/5   LLE Assessment   LLE Assessment WFL   Strength LLE   L Hip Flexion 3/5   L Knee Flexion 3/5   L Knee Extension 3/5   L Ankle Dorsiflexion 3/5   L Ankle Plantar Flexion 3/5   Coordination   Sensation WFL   Light Touch   RLE Light Touch Grossly intact   LLE Light Touch Grossly intact   Bed Mobility   Supine to Sit 2  Maximal assistance   Additional items Assist x 1;HOB elevated; Bedrails;Leg ;Verbal cues;LE management   Sit to Supine 3  Moderate assistance   Additional items Assist x 2;HOB elevated; Increased time required;Verbal cues   Additional Comments Pt sat EOB x 12 min w/ fluctuating mod-max A  Pt retropulsive in sitting, PT provided cues for pt to place hand on bedrail to assist w/ sitting up, which decreased need for A from max to mod  After transitioning from sit to supine, patient was total x 2 for boost towards [facility] for repositioning  While sitting EOB, pt performed SAQ x 5 B, and ankle pumps x 10 B  She required mod-max A to complete these exercises while sitting EOB  Transfers   Sit to Stand Unable to assess  (not appropriate, pt requires mod-max A to sit EOB)   Ambulation/Elevation   Gait pattern Not appropriate   Balance   Static Sitting Poor  (to poor -)   Dynamic Sitting Poor -   Static Standing Zero   Activity Tolerance   Activity Tolerance Patient limited by fatigue   Medical Staff Made Aware Spoke to Nelson Zayas   Nurse Made Aware Spoke to Daniela Mccracken RN   Assessment   Prognosis Fair   Problem List Decreased strength;Decreased range of motion;Decreased endurance; Impaired balance;Decreased mobility; Decreased cognition   Assessment Jorden Barroso is a 79 y/o female who presents [facility] from [facility] w/ reports of increased mental confusion and visual hallucinations  Dx of Visual hallucinations   Order placed for PT eval and tx, w/ activity order of up w/ A and fall risk precautions  Pt presents w/ comorbidities of Parkinson's Disease, HTN, DMII, aortic stenosis and personal factors of advanced age, inability to navigate community distances, decreased cognition, anxiety, depression, inability to perform ADLs and use of wheelchair at baseline  Pt presents w/ weakness, decreased ROM, decreased endurance, impaired cognition, impaired balance, decreased safety awareness and fall risk  These impairments are evident in findings from physical examination (weakness and decreased ROM), mobility assessment (need for mod to max assist w/ all phases of mobility when usually mobilizing independently and tolerance to sitting EOB for only 12 minutes w/ mod to max A), and Barthel Index: 15/100  Pt needed input for task input and safety awareness  Pt is at risk for falls due to physical, safety awareness and cognition deficits  Pt's clinical presentation is unstable/unpredictable (progressive nature of PD, use of WC at baseline, need for heavy assistance w/ bed mobility, decreased cognition limiting command following)  Pt needs inpatient PT tx to improve mobility deficits  Discharge recommendation is for patient to return to [facility] in order to reduce fall risk and maximize level of functional independence  Goals   Patient Goals go home   STG Expiration Date 12/28/19   Short Term Goal #1 Patient will:  Increase bilateral LE strength 1/2 grade to facilitate independent mobility, Perform all bed mobility tasks w/ supervision to decrease fall risk factors, Increase all balance 1/2 grade to decrease risk for falls, Tolerate 3 hr OOB to faciliate upright tolerance, Tolerate seated at EOB 30 min minutes w/ supervision to facilitate functional task performance and Improve Barthel Index score to 40 or greater to facilitate independence, PT to assess transfers when appropriate   PT Treatment Day 0   Plan Treatment/Interventions LE strengthening/ROM; Therapeutic exercise; Endurance training;Cognitive reorientation;Patient/family training;Equipment eval/education; Bed mobility  (PT to assess transfers when appropriate)   PT Frequency 2-3x/wk   Recommendation   Recommendation Other (Comment)  (Return to [facility])   Equipment Recommended Wheelchair   Barthel Index   Feeding 10   Bathing 0   Grooming Score 0   Dressing Score 0   Bladder Score 0   Bowels Score 0   Toilet Use Score 0   Transfers (Bed/Chair) Score 5   Mobility (Level Surface) Score 0   Stairs Score 0   Barthel Index Score 15     Recommend upcoming sessions to continue therapeutic exercise in supine or sitting, and improve sitting EOB endurance and balance  Skilled PT recommended while in hospital and upon DC to progress pt toward treatment goals       Gearldine Jobs, PT

## 2019-12-18 NOTE — PLAN OF CARE
Problem: Potential for Falls  Goal: Patient will remain free of falls  Description  INTERVENTIONS:  - Assess patient frequently for physical needs  -  Identify cognitive and physical deficits and behaviors that affect risk of falls    -  [place] fall precautions as indicated by assessment   - Educate patient/family on patient safety including physical limitations  - Instruct patient to call for assistance with activity based on assessment  - Modify environment to reduce risk of injury  - Consider OT/PT consult to assist with strengthening/mobility  12/18/2019 1241 by Katelin Bloom RN  Outcome: Completed  12/18/2019 0702 by Katelin Bloom RN  Outcome: Progressing     Problem: Prexisting or High Potential for Compromised Skin Integrity  Goal: Skin integrity is maintained or improved  Description  INTERVENTIONS:  - Identify patients at risk for skin breakdown  - Assess and monitor skin integrity  - Assess and monitor nutrition and hydration status  - Monitor labs   - Assess for incontinence   - Turn and reposition patient  - Assist with mobility/ambulation  - Relieve pressure over bony prominences  - Avoid friction and shearing  - Provide appropriate hygiene as needed including keeping skin clean and dry  - Evaluate need for skin moisturizer/barrier cream  - Collaborate with interdisciplinary team   - Patient/family teaching  - Consider wound care consult   12/18/2019 1241 by Katelin Bloom RN  Outcome: Completed  12/18/2019 0702 by Katelin Bloom RN  Outcome: Progressing     Problem: PAIN - ADULT  Goal: Verbalizes/displays adequate comfort level or baseline comfort level  Description  Interventions:  - Encourage patient to monitor pain and request assistance  - Assess pain using appropriate pain scale  - Administer analgesics based on type and severity of pain and evaluate response  - Implement non-pharmacological measures as appropriate and evaluate response  - Consider cultural and social influences on pain and pain management  - Notify physician/advanced practitioner if interventions unsuccessful or patient reports new pain  12/18/2019 1241 by Delores Soni RN  Outcome: Completed  12/18/2019 0702 by Delores Soni RN  Outcome: Progressing     Problem: INFECTION - ADULT  Goal: Absence or prevention of progression during hospitalization  Description  INTERVENTIONS:  - Assess and monitor for signs and symptoms of infection  - Monitor lab/diagnostic results  - Monitor all insertion sites, i e  indwelling lines, tubes, and drains  - Monitor endotracheal if appropriate and nasal secretions for changes in amount and color  - [place] appropriate cooling/warming therapies per order  - Administer medications as ordered  - Instruct and encourage patient and family to use good hand hygiene technique  - Identify and instruct in appropriate isolation precautions for identified infection/condition  12/18/2019 1241 by Delores Soni RN  Outcome: Completed  12/18/2019 0702 by Delores Soni RN  Outcome: Progressing  Goal: Absence of fever/infection during neutropenic period  Description  INTERVENTIONS:  - Monitor WBC    12/18/2019 1241 by Delores Soni RN  Outcome: Completed  12/18/2019 0702 by Delores Soni RN  Outcome: Progressing     Problem: SAFETY ADULT  Goal: Patient will remain free of falls  Description  INTERVENTIONS:  - Assess patient frequently for physical needs  -  Identify cognitive and physical deficits and behaviors that affect risk of falls    -  [place] fall precautions as indicated by assessment   - Educate patient/family on patient safety including physical limitations  - Instruct patient to call for assistance with activity based on assessment  - Modify environment to reduce risk of injury  - Consider OT/PT consult to assist with strengthening/mobility  12/18/2019 1241 by Delores Soni RN  Outcome: Completed  12/18/2019 0702 by Delores Soni RN  Outcome: Progressing  Goal: Maintain or return to baseline ADL function  Description  INTERVENTIONS:  -  Assess patient's ability to carry out ADLs; assess patient's baseline for ADL function and identify physical deficits which impact ability to perform ADLs (bathing, care of mouth/teeth, toileting, grooming, dressing, etc )  - Assess/evaluate cause of self-care deficits   - Assess range of motion  - Assess patient's mobility; develop plan if impaired  - Assess patient's need for assistive devices and provide as appropriate  - Encourage maximum independence but intervene and supervise when necessary  - Involve family in performance of ADLs  - Assess for home care needs following discharge   - Consider OT consult to assist with ADL evaluation and planning for discharge  - Provide patient education as appropriate  12/18/2019 1241 by Sadie Mcclain RN  Outcome: Completed  12/18/2019 0702 by Sadie Mcclain RN  Outcome: Progressing  Goal: Maintain or return mobility status to optimal level  Description  INTERVENTIONS:  - Assess patient's baseline mobility status (ambulation, transfers, stairs, etc )    - Identify cognitive and physical deficits and behaviors that affect mobility  - Identify mobility aids required to assist with transfers and/or ambulation (gait belt, sit-to-stand, lift, walker, cane, etc )  - [place] fall precautions as indicated by assessment  - Record patient progress and toleration of activity level on Mobility SBAR; progress patient to next Phase/Stage  - Instruct patient to call for assistance with activity based on assessment  - Consider rehabilitation consult to assist with strengthening/weightbearing, etc   12/18/2019 1241 by Sadie Mcclain RN  Outcome: Completed  12/18/2019 0702 by Sadie Mcclain RN  Outcome: Progressing     Problem: DISCHARGE PLANNING  Goal: Discharge to home or other facility with appropriate resources  Description  INTERVENTIONS:  - Identify barriers to discharge w/patient and caregiver  - Arrange for needed discharge resources and transportation as appropriate  - Identify discharge learning needs (meds, wound care, etc )  - Arrange for interpretive services to assist at discharge as needed  - Refer to Case Management Department for coordinating discharge planning if the patient needs post-hospital services based on physician/advanced practitioner order or complex needs related to functional status, cognitive ability, or social support system  12/18/2019 1241 by Mauricio Arita RN  Outcome: Completed  12/18/2019 0702 by Mauricio Arita RN  Outcome: Progressing     Problem: Knowledge Deficit  Goal: Patient/family/caregiver demonstrates understanding of disease process, treatment plan, medications, and discharge instructions  Description  Complete learning assessment and assess knowledge base  Interventions:  - Provide teaching at level of understanding  - Provide teaching via preferred learning methods  12/18/2019 1241 by Mauricio Arita RN  Outcome: Completed  12/18/2019 0702 by Mauricio Arita RN  Outcome: Progressing     Problem: Nutrition/Hydration-ADULT  Goal: Nutrient/Hydration intake appropriate for improving, restoring or maintaining nutritional needs  Description  Monitor and assess patient's nutrition/hydration status for malnutrition  Collaborate with interdisciplinary team and initiate plan and interventions as ordered  Monitor patient's weight and dietary intake as ordered or per policy  Utilize nutrition screening tool and intervene as necessary  Determine patient's food preferences and provide high-protein, high-caloric foods as appropriate       INTERVENTIONS:  - Monitor oral intake, urinary output, labs, and treatment plans  - Assess nutrition and hydration status and recommend course of action  - Evaluate amount of meals eaten  - Assist patient with eating if necessary   - Allow adequate time for meals  - Recommend/ encourage appropriate diets, oral nutritional supplements, and vitamin/mineral supplements  - Order, calculate, and assess calorie counts as needed  - Recommend, monitor, and adjust tube feedings and TPN/PPN based on assessed needs  - Assess need for intravenous fluids  - Provide specific nutrition/hydration education as appropriate  - Include patient/family/caregiver in decisions related to nutrition  12/18/2019 1241 by Paulino Blair, RN  Outcome: Completed  12/18/2019 0702 by Paulino Blair, RN  Outcome: Progressing

## 2019-12-18 NOTE — ASSESSMENT & PLAN NOTE
· Pressure is noted  · Patient Denies headache, dizziness, lightheadedness, shortness of breath, or chest pain  · It was noted the patient missed a dose of amlodipine upon admission, as she was on a different schedule dosing for amlodipine, which has now been addressed and corrected to a dear to her outpatient schedule  · Patient is currently on amlodipine 5 mg, amlodipine 2 5 mg, and metoprolol 100 mg q h s

## 2019-12-18 NOTE — PLAN OF CARE
Problem: Potential for Falls  Goal: Patient will remain free of falls  Description  INTERVENTIONS:  - Assess patient frequently for physical needs  -  Identify cognitive and physical deficits and behaviors that affect risk of falls    -  [place] fall precautions as indicated by assessment   - Educate patient/family on patient safety including physical limitations  - Instruct patient to call for assistance with activity based on assessment  - Modify environment to reduce risk of injury  - Consider OT/PT consult to assist with strengthening/mobility  Outcome: Progressing     Problem: Prexisting or High Potential for Compromised Skin Integrity  Goal: Skin integrity is maintained or improved  Description  INTERVENTIONS:  - Identify patients at risk for skin breakdown  - Assess and monitor skin integrity  - Assess and monitor nutrition and hydration status  - Monitor labs   - Assess for incontinence   - Turn and reposition patient  - Assist with mobility/ambulation  - Relieve pressure over bony prominences  - Avoid friction and shearing  - Provide appropriate hygiene as needed including keeping skin clean and dry  - Evaluate need for skin moisturizer/barrier cream  - Collaborate with interdisciplinary team   - Patient/family teaching  - Consider wound care consult   Outcome: Progressing     Problem: PAIN - ADULT  Goal: Verbalizes/displays adequate comfort level or baseline comfort level  Description  Interventions:  - Encourage patient to monitor pain and request assistance  - Assess pain using appropriate pain scale  - Administer analgesics based on type and severity of pain and evaluate response  - Implement non-pharmacological measures as appropriate and evaluate response  - Consider cultural and social influences on pain and pain management  - Notify physician/advanced practitioner if interventions unsuccessful or patient reports new pain  Outcome: Progressing     Problem: INFECTION - ADULT  Goal: Absence or prevention of progression during hospitalization  Description  INTERVENTIONS:  - Assess and monitor for signs and symptoms of infection  - Monitor lab/diagnostic results  - Monitor all insertion sites, i e  indwelling lines, tubes, and drains  - Monitor endotracheal if appropriate and nasal secretions for changes in amount and color  - [place] appropriate cooling/warming therapies per order  - Administer medications as ordered  - Instruct and encourage patient and family to use good hand hygiene technique  - Identify and instruct in appropriate isolation precautions for identified infection/condition  Outcome: Progressing  Goal: Absence of fever/infection during neutropenic period  Description  INTERVENTIONS:  - Monitor WBC    Outcome: Progressing     Problem: SAFETY ADULT  Goal: Patient will remain free of falls  Description  INTERVENTIONS:  - Assess patient frequently for physical needs  -  Identify cognitive and physical deficits and behaviors that affect risk of falls    -  [place] fall precautions as indicated by assessment   - Educate patient/family on patient safety including physical limitations  - Instruct patient to call for assistance with activity based on assessment  - Modify environment to reduce risk of injury  - Consider OT/PT consult to assist with strengthening/mobility  Outcome: Progressing  Goal: Maintain or return to baseline ADL function  Description  INTERVENTIONS:  -  Assess patient's ability to carry out ADLs; assess patient's baseline for ADL function and identify physical deficits which impact ability to perform ADLs (bathing, care of mouth/teeth, toileting, grooming, dressing, etc )  - Assess/evaluate cause of self-care deficits   - Assess range of motion  - Assess patient's mobility; develop plan if impaired  - Assess patient's need for assistive devices and provide as appropriate  - Encourage maximum independence but intervene and supervise when necessary  - Involve family in performance of ADLs  - Assess for home care needs following discharge   - Consider OT consult to assist with ADL evaluation and planning for discharge  - Provide patient education as appropriate  Outcome: Progressing  Goal: Maintain or return mobility status to optimal level  Description  INTERVENTIONS:  - Assess patient's baseline mobility status (ambulation, transfers, stairs, etc )    - Identify cognitive and physical deficits and behaviors that affect mobility  - Identify mobility aids required to assist with transfers and/or ambulation (gait belt, sit-to-stand, lift, walker, cane, etc )  - [place] fall precautions as indicated by assessment  - Record patient progress and toleration of activity level on Mobility SBAR; progress patient to next Phase/Stage  - Instruct patient to call for assistance with activity based on assessment  - Consider rehabilitation consult to assist with strengthening/weightbearing, etc   Outcome: Progressing     Problem: DISCHARGE PLANNING  Goal: Discharge to home or other facility with appropriate resources  Description  INTERVENTIONS:  - Identify barriers to discharge w/patient and caregiver  - Arrange for needed discharge resources and transportation as appropriate  - Identify discharge learning needs (meds, wound care, etc )  - Arrange for interpretive services to assist at discharge as needed  - Refer to Case Management Department for coordinating discharge planning if the patient needs post-hospital services based on physician/advanced practitioner order or complex needs related to functional status, cognitive ability, or social support system  Outcome: Progressing     Problem: Knowledge Deficit  Goal: Patient/family/caregiver demonstrates understanding of disease process, treatment plan, medications, and discharge instructions  Description  Complete learning assessment and assess knowledge base    Interventions:  - Provide teaching at level of understanding  - Provide teaching via preferred learning methods  Outcome: Progressing     Problem: Nutrition/Hydration-ADULT  Goal: Nutrient/Hydration intake appropriate for improving, restoring or maintaining nutritional needs  Description  Monitor and assess patient's nutrition/hydration status for malnutrition  Collaborate with interdisciplinary team and initiate plan and interventions as ordered  Monitor patient's weight and dietary intake as ordered or per policy  Utilize nutrition screening tool and intervene as necessary  Determine patient's food preferences and provide high-protein, high-caloric foods as appropriate       INTERVENTIONS:  - Monitor oral intake, urinary output, labs, and treatment plans  - Assess nutrition and hydration status and recommend course of action  - Evaluate amount of meals eaten  - Assist patient with eating if necessary   - Allow adequate time for meals  - Recommend/ encourage appropriate diets, oral nutritional supplements, and vitamin/mineral supplements  - Order, calculate, and assess calorie counts as needed  - Recommend, monitor, and adjust tube feedings and TPN/PPN based on assessed needs  - Assess need for intravenous fluids  - Provide specific nutrition/hydration education as appropriate  - Include patient/family/caregiver in decisions related to nutrition  Outcome: Progressing
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
## 2019-12-21 PROBLEM — S00.03XA HEMATOMA OF FRONTAL SCALP: Status: ACTIVE | Noted: 2019-01-01

## 2019-12-21 PROBLEM — S60.512A ABRASION OF LEFT HAND: Status: ACTIVE | Noted: 2019-01-01

## 2019-12-21 PROBLEM — S09.90XA MINOR HEAD INJURY: Status: ACTIVE | Noted: 2019-01-01

## 2019-12-21 NOTE — H&P
H&P Exam - Trauma   Jenny Miles 80 y o  male MRN: [id number]  Unit/Bed#: ED 02 Encounter: [id number]    Assessment/Plan   Trauma Alert: Level B  Model of Arrival: Ambulance  Trauma Team: Attending Arash Grissom and Residents Gina  Consultants: None    Trauma Active Problems:   Patient Active Problem List   Diagnosis    Fall    Hematoma of frontal scalp    Abrasion of left hand    Minor head injury         Trauma Plan:   Fall  - mechanical in nature  - pain at site of L hand abrasion and some L knee pain  - CTH and C- Spine negative final read  - CXR: negative on wet read  - hip, L femur, L knee x-rays: negative on wet read  - pt ambulated by nursing without difficulty    L hand abrasion  - no laceration requiring sutures  - bacitracin    Head injury, minor  - written return precaution instructions given for nursing home staff    Dispo: d/c to home nursing facility, f/u with pcp as needed      Cervical Collar Clearance: The patient had a CT scan of the cervical spine demonstrating no acute injury  On exam, the patient had no midline point tenderness or paresthesias/numbness/weakness in the extremities  The patient had full range of motion (was then able to flex, extend, and rotate head laterally) without pain  There were no distracting injuries and the patient was not intoxicated  The patient's cervical spine was cleared radiologically and clinically  Cervical collar removed at this time  Eugenia Cole MD  12/21/2019 10:05 AM       Chief Complaint: Fall    History of Present Illness   HPI:  Jenny Miles is a 80 y o  male who presents following a fall  Per EMS, pt coming from [address] where she fell while getting onto a padded lounge chair  Pt struck head on padding of lounge chair  No LOC  Pt arrive alert, c/o of mild pain at site of L hand abrasion and also L knee pain  No neck pain, weakness, numbness, bowel or bladder symptoms   No lightheadedness or vertigoPt on ASA   Mechanism: Fall    Review of Systems   Constitutional: Negative for chills and fever  HENT: Negative for congestion and sore throat  Eyes: Negative for photophobia and visual disturbance  Respiratory: Negative for cough and shortness of breath  Cardiovascular: Negative for chest pain and leg swelling  Gastrointestinal: Negative for abdominal pain, blood in stool, diarrhea, nausea and vomiting  Genitourinary: Negative for dysuria and hematuria  Musculoskeletal: Negative for back pain, neck pain and neck stiffness  Skin: Positive for wound  Negative for rash  Neurological: Negative for syncope, facial asymmetry, speech difficulty, weakness, light-headedness and numbness  Psychiatric/Behavioral: Negative for behavioral problems and confusion  All other systems reviewed and are negative  12-point, complete review of systems was reviewed and negative except as stated above  Historical Information       Past Medical History:   Diagnosis Date    Constipation     Diabetes mellitus ([facility] )     Hx of long term use of blood thinners     Hyperlipidemia     Hypertension     Parkinson's disease ([facility] )     Psychiatric disorder      History reviewed  No pertinent surgical history  Social History   Social History     Substance and Sexual Activity   Alcohol Use Not Currently     Social History     Substance and Sexual Activity   Drug Use Not Currently     Social History     Tobacco Use   Smoking Status Never Smoker   Smokeless Tobacco Never Used     There is no immunization history for the selected administration types on file for this patient    Last Tetanus: TD 2007 per chart review  Family History: Non-contributory      Meds/Allergies   all current active meds have been reviewed    Allergies   Allergen Reactions    Metformin     Penicillins     Sulfa Antibiotics     Torsemide          PHYSICAL EXAM    Objective   Vitals:   First set: Temperature: 97 9 °F (36 6 °C) (12/21/19 4625)  Pulse: 65 (12/21/19 0935)  Respirations: 18 (12/21/19 0935)  Blood Pressure: 106/78 (12/21/19 0935)    Primary Survey:   (A) Airway: patent  (B) Breathing: BL breath sounds  (C) Circulation: Pulses:   pedal  2/4 and radial  2/4  (D) Disabliity:  GCS Total:  15  (E) Expose:  Completed    Secondary Survey: (Click on Physical Exam tab above)  Physical Exam   Constitutional: He appears well-developed  No distress  HENT:   Head: Normocephalic  Right Ear: External ear normal    Left Ear: External ear normal    Nose: Nose normal    Mouth/Throat: Oropharynx is clear and moist    L frontal scalp hematoma   Eyes: Pupils are equal, round, and reactive to light  Conjunctivae and EOM are normal  Right eye exhibits no discharge  Left eye exhibits no discharge  Neck: Neck supple  No tracheal deviation present  Cardiovascular: Normal rate, regular rhythm, normal heart sounds and intact distal pulses  Pulmonary/Chest: Effort normal and breath sounds normal  No stridor  No respiratory distress  He has no wheezes  He has no rales  He exhibits no tenderness  Abdominal: Soft  Bowel sounds are normal  He exhibits no distension  There is no tenderness  There is no rebound and no guarding  Musculoskeletal:   - C/ - T/ - L spine tenderness      Abrasions over L hand    LUE: No [place] Tenderness, Compartments Soft, No effusion,No Deformity, Full ROM, Intact motor, Intact Distal Sensation, Intact Pincer Grasp  RUE: Old bruise over R hand, No [place] Tenderness, Compartments Soft, No effusion, No Deformity, Full ROM, Intact motor, Intact Distal Sensation, Intact Pincer Grasp  LLE: Mild bruising over L knee with assoc TTP, Compartments Soft, No effusion, No Deformity, Full ROM throughout, Intact motor, Intact Distal Sensation  RLE: No [place] Tenderness, Compartments Soft, No effusion, No Deformity, Full ROM, Intact motor, Intact Distal Sensation             Neurological: He is alert   No cranial nerve deficit or sensory deficit  He exhibits normal muscle tone  Skin: Skin is warm and dry  Capillary refill takes less than 2 seconds  No rash noted  He is not diaphoretic  Psychiatric: His behavior is normal    Nursing note and vitals reviewed  Invasive Devices     Peripheral Intravenous Line            Peripheral IV 12/21/19 Right Antecubital less than 1 day                Lab Results: Results: I have personally reviewed pertinent reports  Imaging/EKG Studies: Results: I have personally reviewed pertinent reports    , FAST: negative  Other Studies: n/a    Code Status: No Order  Advance Directive and Living Will:      Power of :    POLST:

## 2019-12-21 NOTE — ED NOTES
[facility]- no wheelchair services available today  States that just because the patient is a fall risk doesn't make them qualify for BLS transport        Catherine Gong RN  12/21/19 Ashly Zapata RN  12/21/19 [id number]

## 2019-12-21 NOTE — ED NOTES
[facility]- will call back  [facility] Left message  [facility]- no answer  [facility]- no availability  [facility]- no availability  [facility]- no availability     Sabina Peacock RN  12/21/19 0914

## 2019-12-21 NOTE — DISCHARGE INSTRUCTIONS
Abrasion   WHAT YOU NEED TO KNOW:   An abrasion is a scrape on your skin  It happens when your skin rubs against a rough surface  Some examples of an abrasion include rug burn, a skinned elbow, or road rash  Abrasions can be many shapes and sizes  The wound may hurt, bleed, bruise, or swell  DISCHARGE INSTRUCTIONS:   Return to the emergency department if:   · The bleeding does not stop after 10 minutes of firm pressure  · You cannot rinse one or more foreign objects out of your wound  · You have red streaks on your skin coming from your wound  Contact your healthcare provider if:   · You have a fever or chills  · Your abrasion is red, warm, swollen, or draining pus  · You have questions or concerns about your condition or care  Care for your abrasion:   · Wash your hands and dry them with a clean towel  · Press a clean cloth against your wound to stop any bleeding  · Rinse your wound with a lot of clean water  Do not use harsh soap, alcohol, or iodine solutions  · Use a clean, wet cloth to remove any objects, such as small pieces of rocks or dirt  · Rub antibiotic ointment on your wound  This may help prevent infection and help your wound heal     · Cover the wound with a non-stick bandage  Change the bandage daily, and if gets wet or dirty  Follow up with your healthcare provider as directed:  Write down your questions so you remember to ask them during your visits  © 2017 [employer] Information is for End User's use only and may not be sold, redistributed or otherwise used for commercial purposes  All illustrations and images included in CareNotes® are the copyrighted property of [employer] , [employer]  or [employer]  The above information is an  only  It is not intended as medical advice for individual conditions or treatments   Talk to your doctor, nurse or pharmacist before following any medical regimen to see if it is safe and effective for you  Head Injury   WHAT YOU NEED TO KNOW:   A head injury is most often caused by a blow to the head  This may occur from a fall, bicycle injury, sports injury, being struck in the head, or a motor vehicle accident  DISCHARGE INSTRUCTIONS:   Call 911 or have someone else call for any of the following:   · You cannot be woken  · You have a seizure  · You stop responding to others or you faint  · You have blurry or double vision  · Your speech becomes slurred or confused  · You have arm or leg weakness, loss of feeling, or new problems with coordination  · Your pupils are larger than usual or one pupil is a different size than the other  · You have blood or clear fluid coming out of your ears or nose  Return to the emergency department if:   · You have repeated or forceful vomiting  · You feel confused  · Your headache gets worse or becomes severe  · You or someone caring for you notices that you are harder to wake than usual   Contact your healthcare provider if:   · Your symptoms last longer than 6 weeks after the injury  · You have questions or concerns about your condition or care  Medicines:   · Acetaminophen  decreases pain  Acetaminophen is available without a doctor's order  Ask how much to take and how often to take it  Follow directions  Acetaminophen can cause liver damage if not taken correctly  · Take your medicine as directed  Contact your healthcare provider if you think your medicine is not helping or if you have side effects  Tell him or her if you are allergic to any medicine  Keep a list of the medicines, vitamins, and herbs you take  Include the amounts, and when and why you take them  Bring the list or the pill bottles to follow-up visits  Carry your medicine list with you in case of an emergency  Self-care:   · Rest  or do quiet activities for 24 to 48 hours   Limit your time watching TV, using the computer, or doing tasks that require a lot of thinking  Slowly return to your normal activities as directed  Do not play sports or do activities that may cause you to get hit in the head  Ask your healthcare provider when you can return to sports  · Apply ice  on your head for 15 to 20 minutes every hour or as directed  Use an ice pack, or put crushed ice in a plastic bag  Cover it with a towel before you apply it to your skin  Ice helps prevent tissue damage and decreases swelling and pain  · Have someone stay with you for 24 hours  or as directed  This person can monitor you for complications and call [phone]  When you are awake the person should ask you a few questions to see if you are thinking clearly  An example would be to ask your name or your address  Prevent another head injury:   · Wear a helmet that fits properly  Do this when you play sports, or ride a bike, scooter, or skateboard  Helmets help decrease your risk of a serious head injury  Talk to your healthcare provider about other ways you can protect yourself if you play sports  · Wear your seat belt every time you are in a car  This helps to decrease your risk for a head injury if you are in a car accident  Follow up with your healthcare provider as directed:  Write down your questions so you remember to ask them during your visits  © 2017 [employer]  Information is for End User's use only and may not be sold, redistributed or otherwise used for commercial purposes  All illustrations and images included in CareNotes® are the copyrighted property of A [employer]  or [employer]  The above information is an  only  It is not intended as medical advice for individual conditions or treatments  Talk to your doctor, nurse or pharmacist before following any medical regimen to see if it is safe and effective for you

## 2019-12-21 NOTE — ED PROVIDER NOTES
Emergency Department Airway Evaluation and Management Form    History  Obtained from: ems, pt  Review of patient's allergies indicates no known allergies  Chief Complaint:  Trauma Alert    HPI: Pt is a 80 y o  male presents s/p trip and fall and hit on chair  Pt on asa  No loc  Pt with skin tear to left hand o/w no complaints  I have reviewed and agree with the history as documented  Physical Exam    There were no vitals filed for this visit  Supplemental Oxygen: none    GCS: 15    Neuro: Alert and oriented  Psych: not combative, not anxious, cooperative for exam  Neck: In collar, No JVD, No midline tenderness  Cardio:  Normal  Respiratory: Normal  Mouth:  Normal  Pharynx: Normal    Monitor:  NSR      ED Medications    No current facility-administered medications for this encounter  No current outpatient medications on file        Intubation    No intubation required    Final Diagnosis:  Closed head injury, left hand abrasion    ED Provider  Electronically Signed by         Uri Michelle MD  12/21/19 042

## 2019-12-21 NOTE — ED NOTES
Kelsie paged again at this time d/t not returning the call the first time     [facility]  12/21/19 7203

## 2019-12-21 NOTE — ED NOTES
Patient able to ambulate with minimal assistance and with walker, which she uses at nursing home  Per trauma resident, patient ok to be discharged at this time        Iona Cali RN  12/21/19 8522

## 2019-12-21 NOTE — ED NOTES
Nursing home called regarding patients status, made them aware patients transport won't be picking her up until 16:00        Newman Bun  12/21/19 2824

## 2019-12-21 NOTE — ED NOTES
Essence Trevino will be available to transport the patient back to her facility at 1800        [facility]  12/21/19 5809

## 2019-12-23 NOTE — TELEPHONE ENCOUNTER
Patients daughter called with concerns  Facility asked daughter if she would want to consider hospice for patient  Daughter is asking your thoughts on this  She said 2 weeks ago patient was hospitalized  Due to confusion and being off balance  Abilify was added  Patient developed halluncinations  And was sent to [address] [place] [address] (records in [facility]) abilify was increased to 10mg  Confusion continues  Daughter is just asking for your thoughts  She would appreciate a phone call at your convenience

## 2019-12-24 NOTE — TELEPHONE ENCOUNTER
Reviewed prior notes  Spoke with the daughter and discussed her concerns in detail  I do think hospice would be very reasonable  We reviewed what that does and does not mean for the patient and her family  We reviewed that the patient may very well bounce back from this but that it is also certainly possible that this is the patient entering the final phase of this disease

## 2024-08-08 NOTE — ASSESSMENT & PLAN NOTE
[facility] INTERNAL MEDICINE NOTE    Paula Ho is a 63 year old female who presents to clinic today for the following health issues:    Reason for visit:  RASH ON RIGHT HAND AND RIGHT THIGH  Symptom onset:  1-3 days ago  Symptoms include:  Iching AND PAINFUL  Symptom intensity:  Moderate  Symptom progression:  Improving  Had these symptoms before:  Yes  Has tried/received treatment for these symptoms:  No  What makes it worse:  None  What makes it better:  Sleep    Patient Active Problem List   Diagnosis    Chronic knee pain    LIBRA (generalized anxiety disorder)    Brain aneurysm    Chronic, continuous use of opioids    Asthma    Chronic GERD    Chronic pain    Cigarette smoker    DJD (degenerative joint disease)    Insomnia    Morbid obesity (H)    History of subarachnoid hemorrhage    Status post knee surgery    Tobacco use disorder    Chronic obstructive pulmonary disease, unspecified COPD type (H)    MDD (major depressive disorder), recurrent severe, 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 Pappas)    ORTHOPEDIC SURGERY  2004    Circa 2004: right knee partial knee replacement (Iam Ritchie MD)    ORTHOPEDIC SURGERY  2006    Circa 2006: right TKA (Ron Ryder MD; [facility])    ORTHOPEDIC SURGERY  2008    Circa 2008: right TKA revision due to infection (Ron Ryder MD; [facility])    ORTHOPEDIC SURGERY  2009    Circa 2009: right TKA  Patient continues to have rigidity, tremor, bradykinesia and prominent postural instability  She has been tolerating the increased Sinemet well without any hallucinations  It appears she has improvement of her tremors after taking a dose of Sinemet and they worsen around the time she is due  Will try and make a further increase at this time to Sinemet 2tabs qid  She will take 2tabs at 7:30am, 10:30am, 1:30pm, 4:30pm   She also remains on Abilify 5mg daily which was initially started to control her hallucinations  In the future if hallucinations become an issue then would consider switching from Abilify to Nuplazid or Seroquel  She would benefit from more therapy and ROM exercises to maintain her mobility and function  revision due to infection (Ron Ryder MD; [facility])    ORTHOPEDIC SURGERY  2011 April 2011: right TKA (Ron Ryder MD; [facility])    REPLACEMENT TOTAL KNEE Right     TONSILLECTOMY      tonsils    TONSILLECTOMY         Social History     Tobacco Use    Smoking status: Every Day     Current packs/day: 0.50     Types: Cigarettes    Smokeless tobacco: Never    Tobacco comments:     Patient has been trying patches and they have not been working.   Substance Use Topics    Alcohol use: Not Currently     Family History   Problem Relation Age of Onset    Depression Mother     Substance Abuse Father          Allergies   Allergen Reactions    Compazine [Prochlorperazine]     Droperidol     Lisinopril     Morphine      Other reaction(s): hives    Pravastatin      Other reaction(s): Edema    Seroquel [Quetiapine]      Recent Labs   Lab Test 02/27/24  1314 04/02/23  0918 01/18/23  1204 03/15/22  1511 07/12/21  1218 05/20/21  0811 03/02/21  1606 02/23/21  1551   A1C 5.9* 5.9*  --   --   --   --   --   --    *  --   --  128*  --  139*  --   --    HDL 48*  --   --   --   --  51  --   --    TRIG 113  --   --   --   --  82  --   --    ALT  --  18 25 32  --  24  --  28   CR  --  0.57 0.55 0.51*   < > 0.65  --  0.55   GFRESTIMATED  --  >90 >90 >90   < > >90  --  >90   GFRESTBLACK  --   --   --   --   --  >90  --  >90   POTASSIUM  --  4.3 4.8 4.1   < > 4.2  --  4.0   TSH  --   --  3.53 3.71   < >  --    < > 5.77*    < > = values in this interval not displayed.      BP Readings from Last 3 Encounters:   08/08/24 119/79   07/19/24 (!) 144/80   05/21/24 127/77    Wt Readings from Last 3 Encounters:   08/08/24 129.3 kg (285 lb)   07/19/24 130.6 kg (288 lb)   05/21/24 132 kg (291 lb)           ROS:  C: NEGATIVE for fever, chills, change in weight  I: As above.  E: NEGATIVE for vision changes or irritation  E/M: NEGATIVE for ear, mouth and throat problems  R: NEGATIVE for significant cough or SOB  B: NEGATIVE  "for masses, tenderness or discharge  CV: NEGATIVE for chest pain, palpitations or peripheral edema  GI: NEGATIVE for nausea, abdominal pain, heartburn, or change in bowel habits  : NEGATIVE for frequency, dysuria, or hematuria  M: NEGATIVE for significant arthralgias or myalgia  N: NEGATIVE for weakness, dizziness or paresthesias  E: NEGATIVE for temperature intolerance, skin/hair changes  H: NEGATIVE for bleeding problems  P: NEGATIVE for changes in mood or affect    OBJECTIVE:                                                    /79 (BP Location: Left arm, Patient Position: Sitting, Cuff Size: Adult Large)   Pulse 72   Temp 97.9  F (36.6  C) (Temporal)   Resp 20   Ht 1.676 m (5' 6\")   Wt 129.3 kg (285 lb)   LMP  (LMP Unknown)   SpO2 96%   BMI 46.00 kg/m    Body mass index is 46 kg/m .  GENERAL: alert, no distress, and fatigued  EYES: Eyes grossly normal to inspection and conjunctivae and sclerae normal  HENT: normal cephalic/atraumatic  RESP: No audible wheezes.  CV: regular rates and rhythm  MS: no gross musculoskeletal defects noted, no edema  SKIN: Erythematous rash noted on right hand and right thigh.  NEURO: Normal strength and tone, mentation intact and speech normal  PSYCH: mentation appears normal, affect normal/bright    Diagnostic Test Results:  none      ASSESSMENT/PLAN:                                                      Herpes zoster without complication  - valACYclovir (VALTREX) 1000 mg tablet; Take 1 tablet (1,000 mg) by mouth 2 times daily for 7 days  - lidocaine (XYLOCAINE) 5 % external ointment; Apply topically 2 times daily     Disposition:  Follow-up in 4 weeks or as needed.    Renan Dickerson MD  [facility]          "